# Patient Record
Sex: MALE | Race: WHITE | NOT HISPANIC OR LATINO | Employment: FULL TIME | URBAN - METROPOLITAN AREA
[De-identification: names, ages, dates, MRNs, and addresses within clinical notes are randomized per-mention and may not be internally consistent; named-entity substitution may affect disease eponyms.]

---

## 2017-08-11 ENCOUNTER — TELEPHONE (OUTPATIENT)
Dept: MEDICAL GROUP | Facility: MEDICAL CENTER | Age: 42
End: 2017-08-11

## 2017-08-14 ENCOUNTER — OFFICE VISIT (OUTPATIENT)
Dept: MEDICAL GROUP | Facility: MEDICAL CENTER | Age: 42
End: 2017-08-14
Payer: COMMERCIAL

## 2017-08-14 VITALS
BODY MASS INDEX: 25.01 KG/M2 | HEART RATE: 66 BPM | WEIGHT: 155.65 LBS | SYSTOLIC BLOOD PRESSURE: 108 MMHG | OXYGEN SATURATION: 98 % | TEMPERATURE: 98.2 F | HEIGHT: 66 IN | RESPIRATION RATE: 16 BRPM | DIASTOLIC BLOOD PRESSURE: 70 MMHG

## 2017-08-14 DIAGNOSIS — Z71.1 CONCERN ABOUT SKIN DISEASE WITHOUT DIAGNOSIS: ICD-10-CM

## 2017-08-14 DIAGNOSIS — L82.1 SEBORRHEIC KERATOSES: ICD-10-CM

## 2017-08-14 DIAGNOSIS — Z00.00 ROUTINE GENERAL MEDICAL EXAMINATION AT A HEALTH CARE FACILITY: ICD-10-CM

## 2017-08-14 DIAGNOSIS — J30.1 SEASONAL ALLERGIC RHINITIS DUE TO POLLEN: ICD-10-CM

## 2017-08-14 DIAGNOSIS — M25.542 ARTHRALGIA OF LEFT HAND: ICD-10-CM

## 2017-08-14 PROCEDURE — 99204 OFFICE O/P NEW MOD 45 MIN: CPT | Performed by: FAMILY MEDICINE

## 2017-08-14 RX ORDER — LORATADINE 10 MG/1
10 TABLET ORAL DAILY
COMMUNITY
End: 2023-10-12

## 2017-08-14 ASSESSMENT — PATIENT HEALTH QUESTIONNAIRE - PHQ9: CLINICAL INTERPRETATION OF PHQ2 SCORE: 0

## 2017-08-14 NOTE — PROGRESS NOTES
"Subjective:     Chief Complaint   Patient presents with   • Establish Care     DERM REFERRAL/ l PRESSURE ON PINKY       History of Present Illness:  Nicolas Green is a 42 y.o. male patient new to [facility] who presents today to establish primary medical care, discuss management of left hand arthralgias, as well as discuss concerns for seasonal allergies.  PMH, PSH, Social History, Medications, Allergies, FMH all reviewed as documented:    Arthralgia of left hand  Patient states that he has had left hand arthralgias for approximately one year. Arthralgias or localized to second and fifth digits, mostly in the proximal interphalangeal joints, patient states that achy pain is worse in the morning and improves with use throughout the day.    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 Patient is right-hand dominant. Patient does not use left hand is a dominant hand for any particular work.    Of note, patient does have a history of arthritis in his maternal grandmother as well as a history of fibromyalgia and mother. Patient carries no previous diagnoses for arthralgias or other musculoskeletal injuries, also has no sports injuries in the past, and does not have diffuse joint pains.    ROS negative for fevers, chills, generalized weakness, nausea, emesis, erythematous skin lesions.    Seasonal allergic rhinitis due to pollen  Patient states that he has bronchitis approximately 2 times per year, can be pharyngitis instead. Patient states that when these episodes occur, he has mild cough with morning clearing of green colored phlegm. Patient has been on antibiotics in the past for these. Otherwise, patient states that he may \"develop walking pneumonia.\" (He has had 1-2 episodes of pneumonia in the past, was never hospitalized for this.)    Of note, patient works as a , does notice dyspnea on exertion when he has " the symptoms.    Symptoms appear to be well controlled on Claritin 10 mg by mouth daily.    ROS is POSITIVE for morning rhinorrhea, cough with green phlegm in the morning, NEGATIVE for increased lacrimation, b/l itchy eyes,  post-nasal drip, sneezing, wheezing, dyspnea, respiratory distress, palpitations, tachycardia, rash, generalized pruritis, rash/hives.    Seborrheic keratoses  Patient has 2 skin lesions that he and his wife are concerned about. One is on the lateral portion of his left quadriceps, second lesion is on the mid thoracic back approximately T4 area on the left. First lesion is approximately 4 mm in diameter, roughly square shaped. Second lesion is 5-6 mm, elliptical versus rhomboid. Both these lesions are flat, fairly uniform in color, and with regular borders.    Concern about skin disease without diagnosis  In addition to concerns about skin (please see notes from same date of service 8/14/2017 regarding seborrheic keratoses), patient would also like general skin evaluation by dermatologist for annual check.      Patient Active Problem List    Diagnosis Date Noted   • Arthralgia of left hand 08/14/2017   • Seasonal allergic rhinitis due to pollen 08/14/2017   • Seborrheic keratoses 08/14/2017   • Concern about skin disease without diagnosis 08/14/2017       Additional History:   Allergies:    Review of patient's allergies indicates no known allergies.     Medications:     Current Outpatient Prescriptions Ordered in [facility]   Medication Sig Dispense Refill   • loratadine (CLARITIN) 10 MG Tab Take 10 mg by mouth every day.     • Ascorbic Acid (VITAMIN C PO) Take  by mouth.     • Multiple Vitamins-Minerals (CENTRUM ADULTS PO) Take  by mouth.       No current Epic-ordered facility-administered medications on file.        Past Medical History:   History reviewed. No pertinent past medical history.     Past Surgical History:   History reviewed. No pertinent past surgical history.     Social  "History:     Social History   Substance Use Topics   • Smoking status: Never Smoker    • Smokeless tobacco: Never Used   • Alcohol Use: No        Family History:     Family Status   Relation Status Death Age   • Maternal Grandmother Alive    • Mother Alive    • Father  Brain aneurysm   • Brother Alive    • Brother Alive    • Maternal Grandfather     • Paternal Grandmother     • Paternal Grandfather          Family History   Problem Relation Age of Onset   • Arthritis Maternal Grandmother    • Dementia Maternal Grandmother    • Other Mother      Fibromyalgia   • Hyperlipidemia Father    • No Known Problems Brother    • No Known Problems Brother    • Stroke Neg Hx    • Heart Attack Neg Hx    • Cancer Neg Hx        ROS:     - Constitutional: Negative for fever, chills, unexpected weight change, and fatigue/generalized weakness.     - Respiratory: Negative for cough, chest congestion, wheezing, and crackles.      - Cardiovascular: Negative for chest pain, palpitations, orthopnea, and bilateral lower extremity edema.     - Gastrointestinal: Negative for heartburn, nausea, vomiting, abdominal pain, hematochezia, melena, diarrhea, constipation, and greasy/foul-smelling stools.     - Genitourinary: Negative for dysuria, polyuria, hematuria, pyuria, urinary urgency, and urinary incontinence.    - Musculoskeletal: Negative for myalgias, back pain, and joint pain.     - NOTE: All other systems reviewed and are negative, except as in HPI.     Objective:   Physical Exam:    Vitals: Blood pressure 108/70, pulse 66, temperature 36.8 °C (98.2 °F), resp. rate 16, height 1.689 m (5' 6.5\"), weight 70.6 kg (155 lb 10.3 oz), SpO2 98 %.   BMI: Body mass index is 24.75 kg/(m^2).   General/Constitutional: Vitals as above, Well nourished, well developed male in no acute distress   Head/Eyes:  - Head is grossly normal & atraumatic  - Bilateral conjunctivae clear and not injected, bilateral EOMI, bilateral " PERRL   ENT:   - Bilateral external ears grossly normal in appearance, external auditory canals clear & bilateral TMs visualized with appropriate cone of light reflex, hearing grossly intact  - External nares normal in appearance and without discharge/bleeding, bilateral turbinates non-erythematous/non-edematous and without discharge/bleeding  - Good dentition & no palpable fluctuance of gums,  posterior oropharynx without erythema/edema/exudates  Neck: Neck supple, no masses, neck non-tender to palpation, no thyromegaly/goiter   Respiratory: No respiratory distress, bilateral lungs are clear to auscultation in all lung fields (anterior/lateral/posterior), no wheezing/rhonchi/rales   Cardiovascular: Regular rate and rhythm without murmur/gallops/rubs, distal pulses equal and 2+ bilaterally (radial, posterior tibial), no bilateral lower extremity edema   Gastrointestinal: Abdomen resonant to percussion, Bowel sounds present in all 4 quadrants, abdomen non-tender to light and deep palpation, hepatosplenomegaly grossly absent, ventral/umbilical hernias absent    MSK: Palpation of bilateral hands does not reveal any bogginess/fluid collections around joints or otherwise tenderness palpation, palpation of A1 pulleys of left hand reveals clicking/tenderness to palpation of second and third digits, otherwise Gait grossly normal & not antalgic, no tenderness to percussion of vertebral processes, no CVAT, no bilateral SI joint tenderness   Integumentary: 2 skin lesions (First = lateral portion of his left quadriceps, Second = on mid thoracic back approximately T4 area on the left. First lesion is approximately 4 mm in diameter, roughly square shaped. Second lesion is 5-6 mm, elliptical versus rhomboid. Both these lesions are flat, fairly uniform in color, and with regular borders), No apparent rashes   Neuro: Gross motor movement intact in all 4 extremities, Gross sensation intact to extremities and trunk, Gait grossly  normal and not antalgic   Psych: Judgment grossly appropriate, no apparent depression/anxiety      Health Maintenance:      - Appears to be uptodate    Imaging/Labs:      - I have requested previous records ([facility]), and will update accordingly.    Assessment and Plan:   1. Arthralgia of left hand  Uncontrolled.  Appears to be early trigger fingers. We have discussed that in the future he may require either ibuprofen or trigger finger injections.    2. Seasonal allergic rhinitis due to pollen  Stable, well-controlled on OTC Claritin.  Continue Claritin.    3. Seborrheic keratoses  4. Concern about skin disease without diagnosis  Uncontrolled.  We discussed that we can address his cryotherapy. Additionally, patient requesting dermatology referral for general skin check.   - REFERRAL TO DERMATOLOGY    5. Routine general medical examination at a health care facility  Unknown control general health. Labs as below to evaluate.   - HEMOGLOBIN A1C; Future   - LIPID PROFILE; Future   - COMP METABOLIC PANEL; Future    Patient and I discussed the importance of lifestyle changes, with particular emphasis on plant-based nutrition (for the purposes of weight loss, general health), as well as cardiovascular exercise, proper sleep, and stress management.  This discussion is briefly summarized in the patient instruction section below.  Patient verbalized understanding.    RTC: in 2 weeks for labs review, possible Cryotherapy.    PLEASE NOTE: This dictation was created using voice recognition software. I have made every reasonable attempt to correct obvious errors, but I expect that there are errors of grammar and possibly content that I did not discover before finalizing the note.

## 2017-08-14 NOTE — Clinical Note
[facility]  Kirt Marie M.D.  [address]  [place] [address]  Fax: [phone]   Authorization for Release/Disclosure of   Protected Health Information   Name: NICOLAS OLIVERA : 1975 SSN: [id number]   Address: [address] [place] [address]  [place] [address] Phone:    [phone] (home)    I authorize the entity listed below to release/disclose the PHI below to:   [facility]/Kirt Marie M.D. and Kirt Marie M.D.   Provider or Entity Name:     Address   City, State, Zip   Phone:      Fax:     Reason for request: continuity of care   Information to be released:    [  ] LAST COLONOSCOPY,  including any PATH REPORT and follow-up  [  ] LAST FIT/COLOGUARD RESULT [  ] LAST DEXA  [  ] LAST MAMMOGRAM  [  ] LAST PAP  [  ] LAST LABS [  ] RETINA EXAM REPORT  [  ] IMMUNIZATION RECORDS  [  ] Release all info      [  ] Check here and initial the line next to each item to release ALL health information INCLUDING  _____ Care and treatment for drug and / or alcohol abuse  _____ HIV testing, infection status, or AIDS  _____ Genetic Testing    DATES OF SERVICE OR TIME PERIOD TO BE DISCLOSED: _____________  I understand and acknowledge that:  * This Authorization may be revoked at any time by you in writing, except if your health information has already been used or disclosed.  * Your health information that will be used or disclosed as a result of you signing this authorization could be re-disclosed by the recipient. If this occurs, your re-disclosed health information may no longer be protected by State or Federal laws.  * You may refuse to sign this Authorization. Your refusal will not affect your ability to obtain treatment.  * This Authorization becomes effective upon signing and will  on (date) __________.      If no date is indicated, this Authorization will  one (1) year from the signature date.    Name: Nicolas Olivera    Signature:   Date:     2017          PLEASE FAX REQUESTED RECORDS BACK TO: [phone]

## 2017-08-14 NOTE — ASSESSMENT & PLAN NOTE
In addition to concerns about skin (please see notes from same date of service 8/14/2017 regarding seborrheic keratoses), patient would also like general skin evaluation by dermatologist for annual check.

## 2017-08-14 NOTE — ASSESSMENT & PLAN NOTE
Patient has 2 skin lesions that he and his wife are concerned about. One is on the lateral portion of his left quadriceps, second lesion is on the mid thoracic back approximately T4 area on the left. First lesion is approximately 4 mm in diameter, roughly square shaped. Second lesion is 5-6 mm, elliptical versus rhomboid. Both these lesions are flat, fairly uniform in color, and with regular borders.

## 2017-08-14 NOTE — MR AVS SNAPSHOT
"        Nicolas Green   2017 7:40 AM   Office Visit   MRN: [id number]    Department:  [place] [address]   Dept Phone:  [phone]    Description:  Male : 1975   Provider:  Kirt Marie M.D.           Reason for Visit     Establish Care DERM REFERRAL/ l PRESSURE ON PINKY      Allergies as of 2017     No Known Allergies      You were diagnosed with     Arthralgia of left hand   [653782]       Seasonal allergic rhinitis due to pollen   [7727247]       Seborrheic keratoses   [5525105]       Concern about skin disease without diagnosis   [584484]       Routine general medical examination at a health care facility   [V70.0.ICD-9-CM]         Vital Signs     Blood Pressure Pulse Temperature Respirations Height Weight    108/70 mmHg 66 36.8 °C (98.2 °F) 16 1.689 m (5' 6.5\") 70.6 kg (155 lb 10.3 oz)    Body Mass Index Oxygen Saturation Smoking Status             24.75 kg/m2 98% Never Smoker          Basic Information     Date Of Birth Sex Race Ethnicity Preferred Language    1975 Male Unknown Unknown English      Problem List              ICD-10-CM Priority Class Noted - Resolved    Arthralgia of left hand M25.542   2017 - Present    Seasonal allergic rhinitis due to pollen J30.1   2017 - Present    Seborrheic keratoses L82.1   2017 - Present    Concern about skin disease without diagnosis Z71.1   2017 - Present      Health Maintenance        Date Due Completion Dates    IMM INFLUENZA (1) 2017 10/22/2016, 2015    IMM DTaP/Tdap/Td Vaccine (2 - Td) 10/16/2025 10/16/2015            Current Immunizations     Influenza Vaccine Quad Inj (Pf) 10/22/2016 11:09 AM, 2015 10:04 AM    Tdap Vaccine 10/16/2015      Below and/or attached are the medications your provider expects you to take. Review all of your home medications and newly ordered medications with your provider and/or pharmacist. Follow medication instructions as directed by your provider and/or " pharmacist. Please keep your medication list with you and share with your provider. Update the information when medications are discontinued, doses are changed, or new medications (including over-the-counter products) are added; and carry medication information at all times in the event of emergency situations     Allergies:  No Known Allergies          Medications  Valid as of: August 14, 2017 -  8:27 AM    Generic Name Brand Name Tablet Size Instructions for use    Ascorbic Acid   Take  by mouth.        Loratadine (Tab) CLARITIN 10 MG Take 10 mg by mouth every day.        Multiple Vitamins-Minerals   Take  by mouth.        .                 Medicines prescribed today were sent to:     [id number] [place] [facility] [place] - [address] [facility]    [address] [facility] [place] [address]    Phone: [phone] Fax: [phone]    Open 24 Hours?: No      Medication refill instructions:       If your prescription bottle indicates you have medication refills left, it is not necessary to call your provider’s office. Please contact your pharmacy and they will refill your medication.    If your prescription bottle indicates you do not have any refills left, you may request refills at any time through one of the following ways: The online tuul system (except Urgent Care), by calling your provider’s office, or by asking your pharmacy to contact your provider’s office with a refill request. Medication refills are processed only during regular business hours and may not be available until the next business day. Your provider may request additional information or to have a follow-up visit with you prior to refilling your medication.   *Please Note: Medication refills are assigned a new Rx number when refilled electronically. Your pharmacy may indicate that no refills were authorized even though a new prescription for the same medication is available at the pharmacy. Please request the medicine by name with the pharmacy before  "contacting your provider for a refill.        Your To Do List     Future Labs/Procedures Complete By Expires    COMP METABOLIC PANEL  As directed 8/14/2018    HEMOGLOBIN A1C  As directed 8/14/2018    LIPID PROFILE  As directed 8/14/2018    Comments:    Fasting at least 8hours      Referral     A referral request has been sent to our patient care coordination department. Please allow 3-5 business days for us to process this request and contact you either by phone or mail. If you do not hear from us by the 5th business day, please call us at [phone].        Instructions    Dr. Marie's tips for \"Lifestyle Medicine:\"     Check out the talk/documentary on \"How not to die\" by Dr. Chito Red (on his website nutritionfacts.org, he also authored a book with this title).       1) Make SMART lifestyle changes: Specific, Measurable, Attainable, Relevant, Time-sensitive.  The lifestyle changes that you need to make are with regards to: nutrition, cardiovascular exercise, sleep, stress management.  Make these changes every 2 weeks, revisiting the previous goals and perhaps revising them and/or setting new ones.       2) Nutrition: Make as many changes as you can to increase the amount of whole-foods (not Whole Foods, necessarily!  ;-)), plant-based diet as possible:   A) Books: Eat to Live (Dr. Andrea Kerns), The Spectrum (Dr. Bruce Jc), The Starch Solution (Dr. Andrew Le)      B) Documentaries (can usually be found on [employer]): [place] Over Knives.  Fat, Sick, and Nearly Dead.  Fed Up.           3) Cardiovascular Exercise: The center for disease control recommends a minimum of 150 minutes per week of moderate intensity cardiovascular exercise for weight maintenance and cardiovascular health.  Set this as your initial goal, with at least 30 minutes per session. Types of exercise can include 30 minutes of elliptical, 30 minutes of decently fast jog, 30 minutes of swimming, 30 minutes of heavy gardening (lifting big " bags of fertilizer, digging deep holes/ditches).  He can cut down the minute requirements to half, by doing higher intensity sports such as a game of tennis, or soccer.  He notes the library and check out with they have for home exercise programs, as well.       4) Sleep:    A) Goal: Obtain a minimum of 7-8hours of continuous, uninterrupted, restful sleep per night.    B) Tips for Sleep Hygiene:    I) Go to bed and wake up at consistent times whether work/school day or not.     II) Keep room dark, quiet, and comfortable.  Increase exposure to sunlight during awake times and avoid bright lights (especially anything with a backlight) at least the last 1-2hours before going to sleep.     III) Don't nap.     IV) Avoid stimulant or caffeine use more than 4 hours after wake time.        5) Stress Management: You cannot change the stresses of life dizziness necessarily, but you can change how he responds of them. One good way to manage stress is to write things down in order to help you process how to approach things in general or specifically. Another good way is to talk it out with someone you trusts, specifically your significant other or good friend. A definite great way to deal with stress is to have cardiovascular exercise!          Software Technology Access Code: Activation code not generated  Current Software Technology Status: Active

## 2017-08-14 NOTE — ASSESSMENT & PLAN NOTE
"Patient states that he has bronchitis approximately 2 times per year, can be pharyngitis instead. Patient states that when these episodes occur, he has mild cough with morning clearing of green colored phlegm. Patient has been on antibiotics in the past for these. Otherwise, patient states that he may \"develop walking pneumonia.\" (He has had 1-2 episodes of pneumonia in the past, was never hospitalized for this.)    Of note, patient works as a , does notice dyspnea on exertion when he has the symptoms.    Symptoms appear to be well controlled on Claritin 10 mg by mouth daily.    ROS is POSITIVE for morning rhinorrhea, cough with green phlegm in the morning, NEGATIVE for increased lacrimation, b/l itchy eyes,  post-nasal drip, sneezing, wheezing, dyspnea, respiratory distress, palpitations, tachycardia, rash, generalized pruritis, rash/hives.  "

## 2017-08-14 NOTE — PATIENT INSTRUCTIONS
"Dr. Marie's tips for \"Lifestyle Medicine:\"     Check out the talk/documentary on \"How not to die\" by Dr. Chito Red (on his website nutritionfacts.org, he also authored a book with this title).       1) Make SMART lifestyle changes: Specific, Measurable, Attainable, Relevant, Time-sensitive.  The lifestyle changes that you need to make are with regards to: nutrition, cardiovascular exercise, sleep, stress management.  Make these changes every 2 weeks, revisiting the previous goals and perhaps revising them and/or setting new ones.       2) Nutrition: Make as many changes as you can to increase the amount of whole-foods (not Whole Foods, necessarily!  ;-)), plant-based diet as possible:   A) Books: Eat to Live (Dr. Andrea Kerns), The Spectrum (Dr. Bruce Jc), The Starch Solution (Dr. Andrew Le)      B) Documentaries (can usually be found on [employer]): [place] Over Knives.  Fat, Sick, and Nearly Dead.  Fed Up.           3) Cardiovascular Exercise: The center for disease control recommends a minimum of 150 minutes per week of moderate intensity cardiovascular exercise for weight maintenance and cardiovascular health.  Set this as your initial goal, with at least 30 minutes per session. Types of exercise can include 30 minutes of elliptical, 30 minutes of decently fast jog, 30 minutes of swimming, 30 minutes of heavy gardening (lifting big bags of fertilizer, digging deep holes/ditches).  He can cut down the minute requirements to half, by doing higher intensity sports such as a game of tennis, or soccer.  He notes the library and check out with they have for home exercise programs, as well.       4) Sleep:    A) Goal: Obtain a minimum of 7-8hours of continuous, uninterrupted, restful sleep per night.    B) Tips for Sleep Hygiene:    I) Go to bed and wake up at consistent times whether work/school day or not.     II) Keep room dark, quiet, and comfortable.  Increase exposure to sunlight during awake times and " avoid bright lights (especially anything with a backlight) at least the last 1-2hours before going to sleep.     III) Don't nap.     IV) Avoid stimulant or caffeine use more than 4 hours after wake time.        5) Stress Management: You cannot change the stresses of life dizziness necessarily, but you can change how he responds of them. One good way to manage stress is to write things down in order to help you process how to approach things in general or specifically. Another good way is to talk it out with someone you trusts, specifically your significant other or good friend. A definite great way to deal with stress is to have cardiovascular exercise!

## 2017-08-14 NOTE — ASSESSMENT & PLAN NOTE
Patient states that he has had left hand arthralgias for approximately one year. Arthralgias or localized to second and fifth digits, mostly in the proximal interphalangeal joints, patient states that achy pain is worse in the morning and improves with use throughout the day.    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 Patient is right-hand dominant. Patient does not use left hand is a dominant hand for any particular work.    Of note, patient does have a history of arthritis in his maternal grandmother as well as a history of fibromyalgia and mother. Patient carries no previous diagnoses for arthralgias or other musculoskeletal injuries, also has no sports injuries in the past, and does not have diffuse joint pains.    ROS negative for fevers, chills, generalized weakness, nausea, emesis, erythematous skin lesions.

## 2017-08-18 ENCOUNTER — HOSPITAL ENCOUNTER (OUTPATIENT)
Dept: LAB | Facility: MEDICAL CENTER | Age: 42
End: 2017-08-18
Attending: FAMILY MEDICINE
Payer: COMMERCIAL

## 2017-08-18 DIAGNOSIS — Z00.00 ROUTINE GENERAL MEDICAL EXAMINATION AT A HEALTH CARE FACILITY: ICD-10-CM

## 2017-08-18 LAB
ALBUMIN SERPL BCP-MCNC: 4.5 G/DL (ref 3.2–4.9)
ALBUMIN/GLOB SERPL: 2.1 G/DL
ALP SERPL-CCNC: 95 U/L (ref 30–99)
ALT SERPL-CCNC: 52 U/L (ref 2–50)
ANION GAP SERPL CALC-SCNC: 9 MMOL/L (ref 0–11.9)
AST SERPL-CCNC: 38 U/L (ref 12–45)
BILIRUB SERPL-MCNC: 1.5 MG/DL (ref 0.1–1.5)
BUN SERPL-MCNC: 22 MG/DL (ref 8–22)
CALCIUM SERPL-MCNC: 9.5 MG/DL (ref 8.5–10.5)
CHLORIDE SERPL-SCNC: 107 MMOL/L (ref 96–112)
CHOLEST SERPL-MCNC: 184 MG/DL (ref 100–199)
CO2 SERPL-SCNC: 25 MMOL/L (ref 20–33)
CREAT SERPL-MCNC: 1.11 MG/DL (ref 0.5–1.4)
GFR SERPL CREATININE-BSD FRML MDRD: >60 ML/MIN/1.73 M 2
GLOBULIN SER CALC-MCNC: 2.1 G/DL (ref 1.9–3.5)
GLUCOSE SERPL-MCNC: 92 MG/DL (ref 65–99)
HDLC SERPL-MCNC: 52 MG/DL
LDLC SERPL CALC-MCNC: 117 MG/DL
POTASSIUM SERPL-SCNC: 4.4 MMOL/L (ref 3.6–5.5)
PROT SERPL-MCNC: 6.6 G/DL (ref 6–8.2)
SODIUM SERPL-SCNC: 141 MMOL/L (ref 135–145)
TRIGL SERPL-MCNC: 76 MG/DL (ref 0–149)

## 2017-08-18 PROCEDURE — 80061 LIPID PANEL: CPT

## 2017-08-18 PROCEDURE — 83036 HEMOGLOBIN GLYCOSYLATED A1C: CPT

## 2017-08-18 PROCEDURE — 80053 COMPREHEN METABOLIC PANEL: CPT

## 2017-08-18 PROCEDURE — 36415 COLL VENOUS BLD VENIPUNCTURE: CPT

## 2017-08-19 LAB
EST. AVERAGE GLUCOSE BLD GHB EST-MCNC: 103 MG/DL
HBA1C MFR BLD: 5.2 % (ref 0–5.6)

## 2017-08-28 ENCOUNTER — OFFICE VISIT (OUTPATIENT)
Dept: MEDICAL GROUP | Facility: MEDICAL CENTER | Age: 42
End: 2017-08-28
Payer: COMMERCIAL

## 2017-08-28 VITALS
HEART RATE: 65 BPM | OXYGEN SATURATION: 98 % | TEMPERATURE: 97.5 F | DIASTOLIC BLOOD PRESSURE: 70 MMHG | WEIGHT: 156.75 LBS | HEIGHT: 67 IN | SYSTOLIC BLOOD PRESSURE: 110 MMHG | BODY MASS INDEX: 24.6 KG/M2 | RESPIRATION RATE: 14 BRPM

## 2017-08-28 DIAGNOSIS — E78.00 ELEVATED LDL CHOLESTEROL LEVEL: ICD-10-CM

## 2017-08-28 DIAGNOSIS — R74.01 TRANSAMINITIS: ICD-10-CM

## 2017-08-28 PROCEDURE — 99214 OFFICE O/P EST MOD 30 MIN: CPT | Performed by: FAMILY MEDICINE

## 2017-08-28 NOTE — ASSESSMENT & PLAN NOTE
Patient and I discussed recent labs (see below) and that 10year ASCVD risk based on most recent lipid panel, current blood pressure (non-hypertensive, without medication), diabetes status (non-diabetic), and smoking status (non-smoker) is: calculated risk 0.7%, with lifetime risk with 36%.    Patient and I then discussed necessary dietary changes to make to address dyslipidemia.  Patient verbalized understanding.    ROS is NEGATIVE for dizziness, generalized weakness/fatigue, vision/hearing changes, jaw pain/paresthesias, BUE pain/paresthesias/numbness/weakness, chest pain/pressure, palpitations, dyspnea, RUQ abdominal pain, oliguria/anuria, BLE edema.    Lab Results   Component Value Date/Time    CHOLSTRLTOT 184 08/18/2017 06:27 AM     (H) 08/18/2017 06:27 AM    HDL 52 08/18/2017 06:27 AM    TRIGLYCERIDE 76 08/18/2017 06:27 AM

## 2017-08-29 NOTE — ASSESSMENT & PLAN NOTE
Patient reviewed ALT, that is elevated to 52, with findings of normal being 50. Patient denies any increase in alcohol drinking. Patient denies any previous history or suspicion of hepatitis B and hepatitis C, or exposure to it or injection drug use or tattoos.    We discussed therefore that most likely diagnosis is fatty liver disease. Patient verbalizes understanding.

## 2017-08-29 NOTE — PROGRESS NOTES
Subjective:     Chief Complaint   Patient presents with   • Follow-Up       History of Present Illness:  Nicolas Green is a 42 y.o. male established patient who presents today to discuss Cholesterol management, as well as recent labs:    Elevated LDL cholesterol level  Patient and I discussed recent labs (see below) and that 10year ASCVD risk based on most recent lipid panel, current blood pressure (non-hypertensive, without medication), diabetes status (non-diabetic), and smoking status (non-smoker) is: calculated risk 0.7%, with lifetime risk with 36%.    Patient and I then discussed necessary dietary changes to make to address dyslipidemia.  Patient verbalized understanding.    ROS is NEGATIVE for dizziness, generalized weakness/fatigue, vision/hearing changes, jaw pain/paresthesias, BUE pain/paresthesias/numbness/weakness, chest pain/pressure, palpitations, dyspnea, RUQ abdominal pain, oliguria/anuria, BLE edema.    Lab Results   Component Value Date/Time    CHOLSTRLTOT 184 08/18/2017 06:27 AM     (H) 08/18/2017 06:27 AM    HDL 52 08/18/2017 06:27 AM    TRIGLYCERIDE 76 08/18/2017 06:27 AM       Transaminitis  Patient reviewed ALT, that is elevated to 52, with findings of normal being 50. Patient denies any increase in alcohol drinking. Patient denies any previous history or suspicion of hepatitis B and hepatitis C, or exposure to it or injection drug use or tattoos.    We discussed therefore that most likely diagnosis is fatty liver disease. Patient verbalizes understanding.      Patient Active Problem List    Diagnosis Date Noted   • Elevated LDL cholesterol level 08/28/2017   • Transaminitis 08/28/2017   • Arthralgia of left hand 08/14/2017   • Seasonal allergic rhinitis due to pollen 08/14/2017   • Seborrheic keratoses 08/14/2017   • Concern about skin disease without diagnosis 08/14/2017       Additional History:   Allergies:    Review of patient's allergies indicates no known  "allergies.     Current Medications:     Current Outpatient Prescriptions   Medication Sig Dispense Refill   • loratadine (CLARITIN) 10 MG Tab Take 10 mg by mouth every day.     • Ascorbic Acid (VITAMIN C PO) Take  by mouth.     • Multiple Vitamins-Minerals (CENTRUM ADULTS PO) Take  by mouth.       No current facility-administered medications for this visit.         Social History:     Social History   Substance Use Topics   • Smoking status: Never Smoker   • Smokeless tobacco: Never Used   • Alcohol use No       ROS:     - NOTE: All other systems reviewed and are negative, except as in HPI.     Objective:   Physical Exam:    Vitals: Blood pressure 110/70, pulse 65, temperature 36.4 °C (97.5 °F), resp. rate 14, height 1.689 m (5' 6.5\"), weight 71.1 kg (156 lb 12 oz), SpO2 98 %.   BMI: Body mass index is 24.92 kg/m².   General/Constitutional: Vitals as above, Well nourished, well developed male in no acute distress   Head/Eyes: Head is grossly normal & atraumatic, bilateral conjunctivae clear and not injected, bilateral EOMI, bilateral PERRL   ENT: Bilateral external ears grossly normal in appearance, Hearing grossly intact, External nares normal in appearance and without discharge/bleeding   Respiratory: No respiratory distress, bilateral lungs are clear to ausculation in all lung fields (anterior/lateral/posterior), no wheezing/rhonchi/rales   Cardiovascular: Regular rate and rhythm without murmur/gallops/rubs, distal pulses are intact and equal bilaterally (radial, posterior tibial), no bilateral lower extremity edema   MSK: Gait grossly normal & not antalgic   Integumentary: No apparent rashes   Psych: Judgment grossly appropriate, no apparent depression/anxiety    Health Maintenance:      - Appears to be up-to-date.    Imaging/Labs:      - Reviewed interpreted as in history of present illness above.    Assessment and Plan:   NOTE: Patient and I discussed the importance of lifestyle changes, with particular " emphasis on plant-based nutrition (for the purposes of weight loss, general health, LDL elevation, transaminitis), as well as cardiovascular exercise, proper sleep, and stress management.  This discussion is briefly summarized in the patient instruction section below.  Patient verbalized understanding.    1. Elevated LDL cholesterol level  Uncontrolled. See management decision as above. We're withholding medications for now.   - LIPID PROFILE; Future    2. Transaminitis  Uncontrolled. See management decision as above.    - HEPATIC FUNCTION PANEL; Future      RTC: in 07/2018 for annual medical exam.    PLEASE NOTE: This dictation was created using voice recognition software. I have made every reasonable attempt to correct obvious errors, but I expect that there are errors of grammar and possibly content that I did not discover before finalizing the note.

## 2017-10-18 ENCOUNTER — TELEPHONE (OUTPATIENT)
Dept: MEDICAL GROUP | Facility: MEDICAL CENTER | Age: 42
End: 2017-10-18

## 2017-10-18 NOTE — TELEPHONE ENCOUNTER
ESTABLISHED PATIENT PRE-VISIT PLANNING     Note: Patient will not be contacted if there is no indication to call.     1.  Reviewed notes from the last few office visits within the medical group: Yes  08/28/2017  2.  If any orders were placed at last visit or intended to be done for this visit (i.e. 6 mos follow-up), do we have Results/Consult Notes?        •  Labs - Patient is being seen for cough    Note: If patient appointment is for lab review and patient did not complete labs,                check with provider if OK to reschedule patient until labs completed.       •  Imaging - Imaging was not ordered at last office visit.      3. Is this appointment scheduled as a Hospital Follow-Up? No    4.  Immunizations were updated in [facility] using [employer]?: Yes       •  [employer] Recommendations: FLU, HEPATITIS B, MMR , TD and VARICELLA (Chicken Pox)     5.  Patient is due for the following Health Maintenance Topics:   Health Maintenance Due   Topic Date Due   • IMM INFLUENZA (1) 09/01/2017         6.  Patient was NOT informed to arrive 15 min prior to their scheduled appointment and bring in their medication bottles.

## 2017-10-19 ENCOUNTER — OFFICE VISIT (OUTPATIENT)
Dept: MEDICAL GROUP | Facility: MEDICAL CENTER | Age: 42
End: 2017-10-19
Payer: COMMERCIAL

## 2017-10-19 VITALS
RESPIRATION RATE: 16 BRPM | BODY MASS INDEX: 25.36 KG/M2 | HEIGHT: 67 IN | TEMPERATURE: 98.4 F | DIASTOLIC BLOOD PRESSURE: 70 MMHG | WEIGHT: 161.6 LBS | SYSTOLIC BLOOD PRESSURE: 104 MMHG | OXYGEN SATURATION: 97 % | HEART RATE: 82 BPM

## 2017-10-19 DIAGNOSIS — J06.9 ACUTE URI: ICD-10-CM

## 2017-10-19 PROCEDURE — 99214 OFFICE O/P EST MOD 30 MIN: CPT | Performed by: FAMILY MEDICINE

## 2017-10-19 RX ORDER — AZITHROMYCIN 250 MG/1
500 TABLET, FILM COATED ORAL DAILY
Qty: 6 TAB | Refills: 0 | Status: SHIPPED | OUTPATIENT
Start: 2017-10-19 | End: 2018-03-05

## 2017-10-19 RX ORDER — PREDNISONE 20 MG/1
20 TABLET ORAL 2 TIMES DAILY
Qty: 10 TAB | Refills: 0 | Status: SHIPPED | OUTPATIENT
Start: 2017-10-19 | End: 2017-10-24

## 2017-10-19 NOTE — PROGRESS NOTES
Subjective:   Chief Complaint/History of Present Illness:  Nicolas Green is a 42 y.o. male established patient who presents today to have evaluation of acute respiratory illness:    Acute URI  Patient has been having to his history of chronic productive cough, but has not developed into a productive green thick sputum. 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 Otherwise, patient is still considering the same otherwise doesn't base, approximately 20-30 ounces per day.    Patient has been using over-the-counter medications such as decongestants, as well as antihistamines.        Patient has no fever present time, also denies any chills, nausea, emesis, loose stools, rash.      Patient Active Problem List    Diagnosis Date Noted   • Acute URI 10/19/2017   • Elevated LDL cholesterol level 08/28/2017   • Transaminitis 08/28/2017   • Arthralgia of left hand 08/14/2017   • Seasonal allergic rhinitis due to pollen 08/14/2017   • Seborrheic keratoses 08/14/2017   • Concern about skin disease without diagnosis 08/14/2017       Additional History:   Allergies:    Review of patient's allergies indicates no known allergies.     Current Medications:     Current Outpatient Prescriptions   Medication Sig Dispense Refill   • Pseudoephedrine-DM-GG (SUDAFED COUGH PO) Take  by mouth.     • predniSONE (DELTASONE) 20 MG Tab Take 1 Tab by mouth 2 times a day for 5 days. 10 Tab 0   • azithromycin (ZITHROMAX) 250 MG Tab Take 2 Tabs by mouth every day. 6 Tab 0   • loratadine (CLARITIN) 10 MG Tab Take 10 mg by mouth every day.     • Ascorbic Acid (VITAMIN C PO) Take  by mouth.     • Multiple Vitamins-Minerals (CENTRUM ADULTS PO) Take  by mouth.       No current facility-administered medications for this visit.         Social History:     Social History   Substance Use Topics   • Smoking status: Never Smoker   • Smokeless tobacco: Never  "Used   • Alcohol use No       ROS:     - NOTE: All other systems reviewed and are negative, except as in HPI.     Objective:   Physical Exam:    Vitals: Blood pressure 104/70, pulse 82, temperature 36.9 °C (98.4 °F), resp. rate 16, height 1.689 m (5' 6.5\"), weight 73.3 kg (161 lb 9.6 oz), SpO2 97 %.   BMI: Body mass index is 25.69 kg/m².   General/Constitutional: Vitals as above, Well nourished, well developed male in no acute distress   Head/Eyes: Head is grossly normal & atraumatic, bilateral conjunctivae not injected, bilateral EOMI, bilateral PERRL   ENT: Bilateral external ears grossly normal in appearance, Hearing grossly intact, bilateral external canals without cerumen impaction, bilateral TMs are able to the visualized with mild serous fluid without erythema/bulging/exudates, External nares normal in appearance and without discharge/bleeding, bilateral frontal/maxillary sinuses non-tender to palpation, posterior oropharynx with mild erythema but no exudates and without airway obstruction   Respiratory: No respiratory distress, bilateral lungs are clear to ausculation in all lung fields (anterior/lateral/posterior), no wheezing/rhonchi/rales   Cardiovascular: Regular rate and rhythm without murmur/gallops/rubs, distal pulses are intact and equal bilaterally (radial, posterior tibial), no bilateral lower extremity edema   MSK: Gait grossly normal & not antalgic   Integumentary: No apparent rashes   Psych: Judgment grossly appropriate, no apparent depression/anxiety    Health Maintenance:      - Patient inquires about flu shot, and after discussion states he will get it after his acute illness    Assessment and Plan:   1. Acute URI  Uncontrolled.  Patient given stepwise instructions on how to address his symptoms. First step is to increase his fluid intake as well as to continue homeopathic/conservative care at home. Second step is to use prednisone and this is insufficient, and third step would be to use " antibiotics. Please see patient instructions section as below for further details.   - predniSONE (DELTASONE) 20 MG Tab; Take 1 Tab by mouth 2 times a day for 5 days.  Dispense: 10 Tab; Refill: 0   - azithromycin (ZITHROMAX) 250 MG Tab; Take 2 Tabs by mouth every day.  Dispense: 6 Tab; Refill: 0    RTC: In July 2018 as scheduled for his annual medical exam, otherwise Presented earlier for worsening of these symptoms or any other medical concerns/questions/symptoms.    PLEASE NOTE: This dictation was created using voice recognition software. I have made every reasonable attempt to correct obvious errors, but I expect that there are errors of grammar and possibly content that I did not discover before finalizing the note.

## 2017-10-19 NOTE — PATIENT INSTRUCTIONS
Stage 1: Continue taking over-the-counter medications, including antihistamines, nasal decongestants, as well as increasing her fluid hydration. Additionally, things such as chicken soup with garlic can help boost immune system, as chicken contains zinc, and both these things can improvement immune health.    Stage 2: If in 1-2 days on the above therapy your symptoms are not improved, go ahead and take the prednisone twice daily for at least 3 days.    Stage 3: The stage II doesn't work, at the fourth and fifth day, then take the antibiotic twice daily for 3 days.

## 2017-10-19 NOTE — ASSESSMENT & PLAN NOTE
Patient has been having to his history of chronic productive cough, but has not developed into a productive green thick sputum. 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 Otherwise, patient is still considering the same otherwise doesn't base, approximately 20-30 ounces per day.    Patient has been using over-the-counter medications such as decongestants, as well as antihistamines.        Patient has no fever present time, also denies any chills, nausea, emesis, loose stools, rash.

## 2017-11-12 ENCOUNTER — APPOINTMENT (OUTPATIENT)
Dept: SOCIAL WORK | Facility: CLINIC | Age: 42
End: 2017-11-12
Payer: COMMERCIAL

## 2017-11-12 PROCEDURE — 90471 IMMUNIZATION ADMIN: CPT | Performed by: REGISTERED NURSE

## 2017-11-12 PROCEDURE — 90686 IIV4 VACC NO PRSV 0.5 ML IM: CPT | Performed by: REGISTERED NURSE

## 2018-03-05 ENCOUNTER — OFFICE VISIT (OUTPATIENT)
Dept: MEDICAL GROUP | Age: 43
End: 2018-03-05
Payer: COMMERCIAL

## 2018-03-05 VITALS
OXYGEN SATURATION: 97 % | BODY MASS INDEX: 26 KG/M2 | TEMPERATURE: 99.3 F | HEART RATE: 71 BPM | HEIGHT: 66 IN | SYSTOLIC BLOOD PRESSURE: 110 MMHG | DIASTOLIC BLOOD PRESSURE: 70 MMHG | WEIGHT: 161.8 LBS

## 2018-03-05 DIAGNOSIS — L73.9 FOLLICULITIS: ICD-10-CM

## 2018-03-05 DIAGNOSIS — J00 ACUTE NASOPHARYNGITIS: ICD-10-CM

## 2018-03-05 PROCEDURE — 99214 OFFICE O/P EST MOD 30 MIN: CPT | Performed by: INTERNAL MEDICINE

## 2018-03-05 RX ORDER — DOXYCYCLINE HYCLATE 100 MG
100 TABLET ORAL 2 TIMES DAILY
Qty: 14 TAB | Refills: 0 | Status: SHIPPED | OUTPATIENT
Start: 2018-03-05 | End: 2018-03-12 | Stop reason: SDUPTHER

## 2018-03-05 ASSESSMENT — PATIENT HEALTH QUESTIONNAIRE - PHQ9: CLINICAL INTERPRETATION OF PHQ2 SCORE: 0

## 2018-03-05 NOTE — ASSESSMENT & PLAN NOTE
Patient reported that he has itchy bumps on his anterior chest wall and back. He stated that symptoms started 3 days ago after he is wearing thick layers of clothes at work. He reported itchiness on the bumps.

## 2018-03-05 NOTE — ASSESSMENT & PLAN NOTE
This is a new problem. Patient reported having runny nose, sinus congestion and cough for 1.5 weeks. He tries over-the-counter Sudafed as was slightly improvement. He still has runny nose and sinus congestion. He denies chest congestion or wheezing. He reported feeling pain on his throat. He denied fever, chills.

## 2018-03-05 NOTE — PROGRESS NOTES
"Subjective:   Nicolas Green is a 42 y.o. male here today for evaluation and management of:      Acute nasopharyngitis  This is a new problem. Patient reported having runny nose, sinus congestion and cough for 1.5 weeks. He tries over-the-counter Sudafed as was slightly improvement. He still has runny nose and sinus congestion. He denies chest congestion or wheezing. He reported feeling pain on his throat. He denied fever, chills.    Folliculitis  Patient reported that he has itchy bumps on his anterior chest wall and back. He stated that symptoms started 3 days ago after he is wearing thick layers of clothes at work. He reported itchiness on the bumps.         Current medicines (including changes today)  Current Outpatient Prescriptions   Medication Sig Dispense Refill   • doxycycline (VIBRAMYCIN) 100 MG Tab Take 1 Tab by mouth 2 times a day for 7 days. 14 Tab 0   • Pseudoephedrine-DM-GG (SUDAFED COUGH PO) Take  by mouth.     • loratadine (CLARITIN) 10 MG Tab Take 10 mg by mouth every day.     • Ascorbic Acid (VITAMIN C PO) Take  by mouth.     • Multiple Vitamins-Minerals (CENTRUM ADULTS PO) Take  by mouth.       No current facility-administered medications for this visit.      He  has no past medical history on file.    ROS   No chest pain, no shortness of breath, no abdominal pain       Objective:     Blood pressure 110/70, pulse 71, temperature 37.4 °C (99.3 °F), height 1.676 m (5' 6\"), weight 73.4 kg (161 lb 12.8 oz), SpO2 97 %. Body mass index is 26.12 kg/m².   Physical Exam:  General: Alert, oriented and no acute distress.  Eye contact is good, speech goal directed, affect calm  HEENT: conjunctiva non-injected, sclera non-icteric, Mild erythema on oropharynx but no exudate. Mild swelling and erythema on both nasal cavity with watery discharge.  Oral mucous membranes pink and moist with no lesions.  Pinna normal. TM pearly gray.   Neck No supraclavicular, submandibular, submental lymphadenopathy or " masses in the neck or supraclavicular regions.  Lungs: Normal respiratory effort, clear to auscultation bilaterally with good excursion.  CV: regular rate and rhythm. No murmurs.   Abdomen: soft, non distended, nontender, Bowel sound normal.  Ext: no edema, color normal, vascularity normal, temperature normal  Skin exam: Erythematous papular rash on anterior chest wall and back with whiteheads.      Assessment and Plan:   The following treatment plan was discussed     1. Acute nasopharyngitis  - Discussed at length to rinse sinuses with over the counter nasal wash or Netti pot once or twice a day and then use flonase nasal spray once or twice a day after rinsing sinuses  - Advised to try nasal steam therapy.   - Advised to gargle his throat with warm salt water twice a day.  - Advised to increase water intake.  - Advised to limit use of Sudafed. He can take Claritin 10 mg daily.    2. Folliculitis  - Will try doxycycline to take one tablet twice a day for 7 days. Advised to take with probiotics. Reviewed potential side effects of doxycycline with patient.  - Recommend to stop taking doxycycline if he has any side effects or allergic reaction from taking it.  - doxycycline (VIBRAMYCIN) 100 MG Tab; Take 1 Tab by mouth 2 times a day for 7 days.  Dispense: 14 Tab; Refill: 0      Followup: Return if symptoms worsen or fail to improve.      Please note that this dictation was created using voice recognition software. I have made every reasonable attempt to correct obvious errors, but I expect that there may have unintended errors in text, spelling, punctuation, or grammar that I did not discover.

## 2018-03-12 ENCOUNTER — OFFICE VISIT (OUTPATIENT)
Dept: MEDICAL GROUP | Facility: LAB | Age: 43
End: 2018-03-12
Payer: COMMERCIAL

## 2018-03-12 VITALS
RESPIRATION RATE: 12 BRPM | BODY MASS INDEX: 24.64 KG/M2 | DIASTOLIC BLOOD PRESSURE: 72 MMHG | WEIGHT: 157 LBS | HEIGHT: 67 IN | HEART RATE: 60 BPM | OXYGEN SATURATION: 97 % | SYSTOLIC BLOOD PRESSURE: 106 MMHG | TEMPERATURE: 98.2 F

## 2018-03-12 DIAGNOSIS — L73.9 FOLLICULITIS: ICD-10-CM

## 2018-03-12 PROCEDURE — 99214 OFFICE O/P EST MOD 30 MIN: CPT | Performed by: FAMILY MEDICINE

## 2018-03-12 RX ORDER — DOXYCYCLINE HYCLATE 100 MG
100 TABLET ORAL 2 TIMES DAILY
Qty: 6 TAB | Refills: 0 | Status: SHIPPED | OUTPATIENT
Start: 2018-03-12 | End: 2018-03-15

## 2018-03-12 NOTE — PROGRESS NOTES
Subjective:      Nicolas Green is a 42 y.o. male who presents with Rash (was seen on 3/5 for this and taking doxycycline and kenalog/ helping a little not going away X 10 days)    Chief Complaint   Patient presents with   • Rash     was seen on 3/5 for this and taking doxycycline and kenalog/ helping a little not going away X 10 days             HPI     Patient is here with above. Patient states that he started to have a rash on 3/3. He was seen on 3/5 and was diagnosed with folliculitis. He was started on doxycycline 100 mg bid for 7 days. Also told to take probiotics. He also has been applying kenalog and he is not sure that this is helping. Patient is here today for follow up. States he took his last doxycycline this morning.  Thinks that he is geting better, about 60-70% better. Rash is not irritating him as much as far as the itching goes. Started on his torso and then spread. Was torso then back now he has a few spots on the arms and back of the legs. His wife told him that the rash on his back is much better. He is a  and wears layers of clothes at work and does get pretty sweaty at times.  Overall he is feeling much better however he is concerned as the lesions do look the same as how they started and they continue to keep appearing in different spots. He states that he is definitely on the mend. Reports no side effects with the antibiotic. No diarrhea.    He has had hives in the past while in college and also has a history of chicken pox and is making sure this is not the same. He is not taking an antihistamine at this time.     Reports he has not started any new medications prior to the onset of the rash. He's tried no new soaps, detergents, lotions, body products. Reports he has not been in any hot tubs. Reports no insect bites. No nausea. No fever. No SOB    Patient Active Problem List   Diagnosis   • Arthralgia of left hand   • Seasonal allergic rhinitis due to pollen   •  "Seborrheic keratoses   • Concern about skin disease without diagnosis   • Elevated LDL cholesterol level   • Transaminitis   • Acute URI   • Acute nasopharyngitis   • Folliculitis     Family History   Problem Relation Age of Onset   • Arthritis Maternal Grandmother    • Dementia Maternal Grandmother    • Other Mother      Fibromyalgia   • Hyperlipidemia Father    • No Known Problems Brother    • No Known Problems Brother    • Stroke Neg Hx    • Heart Attack Neg Hx    • Cancer Neg Hx      Social History     Social History   • Marital status: Unknown     Spouse name: N/A   • Number of children: N/A   • Years of education: N/A     Occupational History   • Not on file.     Social History Main Topics   • Smoking status: Never Smoker   • Smokeless tobacco: Never Used   • Alcohol use No   • Drug use: No   • Sexual activity: Yes     Partners: Female     Other Topics Concern   • Not on file     Social History Narrative   • No narrative on file       Current Outpatient Prescriptions:   •  doxycycline (VIBRAMYCIN) 100 MG Tab, Take 1 Tab by mouth 2 times a day for 7 days., Disp: 14 Tab, Rfl: 0  •  Pseudoephedrine-DM-GG (SUDAFED COUGH PO), Take  by mouth., Disp: , Rfl:   •  loratadine (CLARITIN) 10 MG Tab, Take 10 mg by mouth every day., Disp: , Rfl:   •  Ascorbic Acid (VITAMIN C PO), Take  by mouth., Disp: , Rfl:   •  Multiple Vitamins-Minerals (CENTRUM ADULTS PO), Take  by mouth., Disp: , Rfl:     ROS       Objective:     /72   Pulse 60   Temp 36.8 °C (98.2 °F)   Resp 12   Ht 1.689 m (5' 6.5\")   Wt 71.2 kg (157 lb)   SpO2 97%   BMI 24.96 kg/m²      Physical Exam   Constitutional: He appears well-developed and well-nourished. He is active and cooperative.  Non-toxic appearance. He does not have a sickly appearance. He does not appear ill. No distress.   HENT:   Head: Normocephalic and atraumatic.   Eyes: Conjunctivae and EOM are normal.   Neurological: He is alert. He is not disoriented. He displays no tremor. He " displays no seizure activity. Coordination and gait normal.   Skin: Skin is warm and dry. He is not diaphoretic. No pallor.   Very few scattered erythematous lesions on the arms, legs    Psychiatric: He has a normal mood and affect. His speech is normal and behavior is normal. He is not actively hallucinating. He is attentive.             Assessment/Plan:       1. Folliculitis  Uncontrolled however improving. Will give another 3 days of antibiotics. Recommend that he start an antihistamine with out decongestant. 6 can continue to use topical Kenalog sparingly if needed twice daily. Follow up with PCP if needed. No SOB, no fever   - doxycycline (VIBRAMYCIN) 100 MG Tab; Take 1 Tab by mouth 2 times a day for 3 days.  Dispense: 6 Tab; Refill: 0    Please note that this dictation was created using voice recognition software. I have made every reasonable attempt to correct obvious errors, but I expect that there are errors of grammar and possibly content that I did not discover before finalizing the note.

## 2018-05-05 ENCOUNTER — PATIENT OUTREACH (OUTPATIENT)
Dept: HEALTH INFORMATION MANAGEMENT | Facility: OTHER | Age: 43
End: 2018-05-05

## 2018-05-05 NOTE — PROGRESS NOTES
1. Attempt #: 1    2. [facility] Verified: yes    3. Verify PCP: yes    5.  Reviewed/Updated the following with patient:       •   Communication Preference Obtained? YES      6. MyChart Activation: already active        9. Care Gap Scheduling (Attempt to Schedule EACH Overdue Care Gap!)     There are no preventive care reminders to display for this patient.     Patient has completed No Call - Was made to the pt for the [employer] project - Health Maintenance is up todate.

## 2018-07-09 ENCOUNTER — OFFICE VISIT (OUTPATIENT)
Dept: MEDICAL GROUP | Facility: MEDICAL CENTER | Age: 43
End: 2018-07-09
Payer: COMMERCIAL

## 2018-07-09 VITALS
TEMPERATURE: 98.6 F | HEIGHT: 66 IN | HEART RATE: 47 BPM | OXYGEN SATURATION: 98 % | BODY MASS INDEX: 23.78 KG/M2 | SYSTOLIC BLOOD PRESSURE: 92 MMHG | WEIGHT: 148 LBS | DIASTOLIC BLOOD PRESSURE: 60 MMHG

## 2018-07-09 DIAGNOSIS — Z82.49 FAMILY HISTORY OF BRAIN ANEURYSM: ICD-10-CM

## 2018-07-09 DIAGNOSIS — J30.1 SEASONAL ALLERGIC RHINITIS DUE TO POLLEN: ICD-10-CM

## 2018-07-09 DIAGNOSIS — E78.00 ELEVATED LDL CHOLESTEROL LEVEL: ICD-10-CM

## 2018-07-09 DIAGNOSIS — Z00.00 ANNUAL PHYSICAL EXAM: ICD-10-CM

## 2018-07-09 DIAGNOSIS — R74.01 TRANSAMINITIS: ICD-10-CM

## 2018-07-09 PROBLEM — Z71.1 CONCERN ABOUT SKIN DISEASE WITHOUT DIAGNOSIS: Status: RESOLVED | Noted: 2017-08-14 | Resolved: 2018-07-09

## 2018-07-09 PROBLEM — J00 ACUTE NASOPHARYNGITIS: Status: RESOLVED | Noted: 2018-03-05 | Resolved: 2018-07-09

## 2018-07-09 PROBLEM — M25.542 ARTHRALGIA OF LEFT HAND: Status: RESOLVED | Noted: 2017-08-14 | Resolved: 2018-07-09

## 2018-07-09 PROBLEM — J06.9 ACUTE URI: Status: RESOLVED | Noted: 2017-10-19 | Resolved: 2018-07-09

## 2018-07-09 PROBLEM — L73.9 FOLLICULITIS: Status: RESOLVED | Noted: 2018-03-05 | Resolved: 2018-07-09

## 2018-07-09 PROCEDURE — 99396 PREV VISIT EST AGE 40-64: CPT | Performed by: FAMILY MEDICINE

## 2018-07-10 NOTE — ASSESSMENT & PLAN NOTE
Patient and I discussed recent labs (see below; elevated LDL alone, uncontrolled) and that ASCVD risk is increased based on most recent lipid panel, current blood pressure (non-hypertensive, without medication), diabetes status (non-diabetic), and smoking status (non-smoker).    Patient and I then discussed necessary dietary changes to make to address dyslipidemia.  Patient is NOT currently taking cholesterol lowering medication.  Patient verbalized understanding.    ROS is NEGATIVE for dizziness, generalized weakness/fatigue, vision/hearing changes, jaw pain/paresthesias, BUE pain/paresthesias/numbness/weakness, chest pain/pressure, palpitations, dyspnea, RUQ abdominal pain, oliguria/anuria, BLE edema.    Lab Results   Component Value Date/Time    CHOLSTRLTOT 184 08/18/2017 06:27 AM     (H) 08/18/2017 06:27 AM    HDL 52 08/18/2017 06:27 AM    TRIGLYCERIDE 76 08/18/2017 06:27 AM

## 2018-07-10 NOTE — ASSESSMENT & PLAN NOTE
Chronic, stable, well-controlled on as needed usage of OTC antihistamines as well as OTC decongestants and cough syrup, all as needed    ROS is negative for increased lacrimation, b/l itchy eyes,  rhinorrhea, post-nasal drip, sneezing, wheezing, dyspnea, respiratory distress, palpitations, tachycardia, generalized pruritis, rash/hives.

## 2018-07-10 NOTE — ASSESSMENT & PLAN NOTE
Chronic, stable, will control per symptoms.    Patient denies binge or regular&heavy alcohol drinking behaviors, denies IV drug use, denies recent sexual intercourse with new partners.    ROS is NEGATIVE for generalized weakness/fatigue, generalized pruritis, jaundice, RUQ pain.

## 2018-07-10 NOTE — ASSESSMENT & PLAN NOTE
Chronic, unknown control, patient states that his father was diagnosed with a ruptured brain aneurysm upon postmortem autopsy.  Patient's father was otherwise asymptomatic prior to that time.  Patient and I did review the signs and symptoms of hemorrhagic CVA as well as hypertensive emergency.  Patient denies any of the symptoms in both his father as well as himself at present time.    Of note, patient was previously examined by neurosurgery with a CT of the head, determined to be within normal limits, and has since had a gap in follow-up.  Patient is wondering if this is something worthwhile to pursue.  I described to the patient that since his father did not exhibit symptoms until the early 60s, general recommendations might be to resume evaluation when patient enters into his 50s.      However, due to patient's concern about father's asymptomatic status prior to onset of brain aneurysm rupture, I do think it is reasonable for him to have consultation with neurosurgery.    ROS is NEGATIVE for dizziness, generalized weakness/fatigue, cold sweats, dizziness,  vision/hearing changes, jaw pain/paresthesias, BUE pain/paresthesias/numbness/weakness, chest pain/pressure, palpitations, dyspnea, nausea, RUQ abdominal pain, oliguria/anuria, BLE edema.     ROS is NEGATIVE for confusion, altered mentation, word finding difficulty, memory loss, diplopia, visual scotomas, facial droop, dysarthria, dysphagia, hemiplegia, gait instability, new/abnormal paresthesias/numbness.

## 2018-07-10 NOTE — PROGRESS NOTES
Subjective:   Chief Complaint/History of Present Illness:  Nicolas Green is a 42 y.o. male established who presents today for Annual Medical exam, to review the following medical problems.      Diagnoses of Annual physical exam, Family history of brain aneurysm, Seasonal allergic rhinitis due to pollen, Transaminitis, and Elevated LDL cholesterol level were pertinent to this visit.    PMH, PSH, Social History, Medications, Allergies, FMH all reviewed as documented:    Family history of brain aneurysm  Chronic, unknown control, patient states that his father was diagnosed with a ruptured brain aneurysm upon postmortem autopsy.  Patient's father was otherwise asymptomatic prior to that time.  Patient and I did review the signs and symptoms of hemorrhagic CVA as well as hypertensive emergency.  Patient denies any of the symptoms in both his father as well as himself at present time.    Of note, patient was previously examined by neurosurgery with a CT of the head, determined to be within normal limits, and has since had a gap in follow-up.  Patient is wondering if this is something worthwhile to pursue.  I described to the patient that since his father did not exhibit symptoms until the early 60s, general recommendations might be to resume evaluation when patient enters into his 50s.      However, due to patient's concern about father's asymptomatic status prior to onset of brain aneurysm rupture, I do think it is reasonable for him to have consultation with neurosurgery.    ROS is NEGATIVE for dizziness, generalized weakness/fatigue, cold sweats, dizziness,  vision/hearing changes, jaw pain/paresthesias, BUE pain/paresthesias/numbness/weakness, chest pain/pressure, palpitations, dyspnea, nausea, RUQ abdominal pain, oliguria/anuria, BLE edema.     ROS is NEGATIVE for confusion, altered mentation, word finding difficulty, memory loss, diplopia, visual scotomas, facial droop, dysarthria, dysphagia, hemiplegia,  gait instability, new/abnormal paresthesias/numbness.     Seasonal allergic rhinitis due to pollen  Chronic, stable, well-controlled on as needed usage of OTC antihistamines as well as OTC decongestants and cough syrup, all as needed    ROS is negative for increased lacrimation, b/l itchy eyes,  rhinorrhea, post-nasal drip, sneezing, wheezing, dyspnea, respiratory distress, palpitations, tachycardia, generalized pruritis, rash/hives.     Transaminitis  Chronic, stable, will control per symptoms.    Patient denies binge or regular&heavy alcohol drinking behaviors, denies IV drug use, denies recent sexual intercourse with new partners.    ROS is NEGATIVE for generalized weakness/fatigue, generalized pruritis, jaundice, RUQ pain.      Elevated LDL cholesterol level  Patient and I discussed recent labs (see below; elevated LDL alone, uncontrolled) and that ASCVD risk is increased based on most recent lipid panel, current blood pressure (non-hypertensive, without medication), diabetes status (non-diabetic), and smoking status (non-smoker).    Patient and I then discussed necessary dietary changes to make to address dyslipidemia.  Patient is NOT currently taking cholesterol lowering medication.  Patient verbalized understanding.    ROS is NEGATIVE for dizziness, generalized weakness/fatigue, vision/hearing changes, jaw pain/paresthesias, BUE pain/paresthesias/numbness/weakness, chest pain/pressure, palpitations, dyspnea, RUQ abdominal pain, oliguria/anuria, BLE edema.    Lab Results   Component Value Date/Time    CHOLSTRLTOT 184 08/18/2017 06:27 AM     (H) 08/18/2017 06:27 AM    HDL 52 08/18/2017 06:27 AM    TRIGLYCERIDE 76 08/18/2017 06:27 AM       Patient Active Problem List    Diagnosis Date Noted   • Family history of brain aneurysm 07/09/2018   • Elevated LDL cholesterol level 08/28/2017   • Transaminitis 08/28/2017   • Seasonal allergic rhinitis due to pollen 08/14/2017   • Seborrheic keratoses 08/14/2017  "      Additional History:   Allergies:    Patient has no known allergies.     Medications:     Current Outpatient Prescriptions Ordered in [facility]   Medication Sig Dispense Refill   • Pseudoephedrine-DM-GG (SUDAFED COUGH PO) Take  by mouth.     • loratadine (CLARITIN) 10 MG Tab Take 10 mg by mouth every day.     • Ascorbic Acid (VITAMIN C PO) Take  by mouth.     • Multiple Vitamins-Minerals (CENTRUM ADULTS PO) Take  by mouth.       No current Epic-ordered facility-administered medications on file.         Past Medical History:   No past medical history on file.     Past Surgical History:   No past surgical history on file.     Social History:     Social History   Substance Use Topics   • Smoking status: Never Smoker   • Smokeless tobacco: Never Used   • Alcohol use No        Family History:     Family Status   Relation Status   • Maternal Grandmother Alive   • Mother Alive   • Father  at age Brain aneurysm       • Brother Alive   • Brother Alive   • Maternal Grandfather    • Paternal Grandmother    • Paternal Grandfather    • Neg Hx         Family History   Problem Relation Age of Onset   • Arthritis Maternal Grandmother      OA   • Dementia Maternal Grandmother    • Hyperlipidemia Maternal Grandmother    • Other Mother      Fibromyalgia   • Arthritis Mother      OA   • Hyperlipidemia Father    • No Known Problems Brother    • No Known Problems Brother    • Stroke Neg Hx    • Heart Attack Neg Hx    • Cancer Neg Hx        ROS:     - NOTE: All other systems reviewed and are negative, except as in HPI.     Objective:   Physical Exam:    Vitals: Blood pressure (!) 92/60, pulse (!) 47, temperature 37 °C (98.6 °F), height 1.676 m (5' 6\"), weight 67.1 kg (148 lb), SpO2 98 %.   BMI: Body mass index is 23.89 kg/m².   General/Constitutional: Vitals as above, Well nourished, well developed male in no acute distress   Head/Eyes:  - Head is grossly normal & atraumatic  - Bilateral conjunctivae " clear and not injected, bilateral EOMI, bilateral PERRL   ENT:   - Bilateral external ears grossly normal in appearance, hearing grossly intact  - External nares normal in appearance and without discharge/bleeding, bilateral turbinates non-erythematous/non-edematous and without discharge/bleeding  - Good dentition,  posterior oropharynx without erythema/edema/exudates  Neck: Neck supple, no masses, neck non-tender to palpation, no thyromegaly/goiter   Respiratory: No respiratory distress, bilateral lungs are clear to auscultation in all lung fields (anterior/lateral/posterior), no wheezing/rhonchi/rales   Cardiovascular: Regular rate and rhythm without murmur/gallops/rubs,distal pulses equal and 2+ bilaterally (radial, posterior tibial), no bilateral lower extremity edema   MSK: Gait grossly normal & not antalgic, no tenderness to percussion of vertebral processes, no CVAT, no bilateral SI joint tenderness   Integumentary: No apparent rashes   Neuro: Gross motor movement intact in all 4 extremities, Gross sensation intact to extremities and trunk, Gait grossly normal and not antalgic   Psych: Judgment grossly appropriate, no apparent depression/anxiety    Health Maintenance:     - Reviewed and found to be up-to-date    Imaging/Labs:     - 08/19/17 -- , o/w Lipid Profile WNL, CMP WNL, A1c WNL    Assessment and Plan:   1. Annual physical exam  Patient in relatively good health overall, apart from LDL elevation, and concerns regarding brain aneurysm.   - HEMOGLOBIN A1C; Future   - COMP METABOLIC PANEL; Future   - LIPID PROFILE; Future    2. Family history of brain aneurysm  New problem for medical evaluation to me, uncontrolled, referral to neurosurgery for further consultation/evaluation/management.  In the meantime, we did discuss the evaluation of possible comorbidities such as prediabetes/diabetes, hyperlipidemia, as well as hepatic or renal dysfunction.  These could increase his risk of hypertension and  atherosclerotic disease.   - REFERRAL TO NEUROSURGERY   - HEMOGLOBIN A1C; Future   - COMP METABOLIC PANEL; Future   - LIPID PROFILE; Future    3. Seasonal allergic rhinitis due to pollen  Chronic, stable, well-controlled.  Continue as needed usage of OTC antihistamines/nasal decongestant/cough syrups    4. Transaminitis  Chronic, stable, well-controlled per symptoms, however unknown control.  Labs, CMP to evaluate.   - COMP METABOLIC PANEL; Future    5. Elevated LDL cholesterol level  Chronic, stable, well-controlled per symptoms, however unknown control.  Labs, Lipid profile to evaluate.   - LIPID PROFILE; Future      RTC: in 1 year for Annual Medical Exam.    PLEASE NOTE: This dictation was created using voice recognition software. I have made every reasonable attempt to correct obvious errors, but I expect that there are errors of grammar and possibly content that I did not discover before finalizing the note.

## 2018-07-23 ENCOUNTER — HOSPITAL ENCOUNTER (OUTPATIENT)
Dept: LAB | Facility: MEDICAL CENTER | Age: 43
End: 2018-07-23
Attending: FAMILY MEDICINE
Payer: COMMERCIAL

## 2018-07-23 DIAGNOSIS — Z00.00 ANNUAL PHYSICAL EXAM: ICD-10-CM

## 2018-07-23 DIAGNOSIS — E78.00 ELEVATED LDL CHOLESTEROL LEVEL: ICD-10-CM

## 2018-07-23 DIAGNOSIS — R74.01 TRANSAMINITIS: ICD-10-CM

## 2018-07-23 DIAGNOSIS — Z82.49 FAMILY HISTORY OF BRAIN ANEURYSM: ICD-10-CM

## 2018-07-23 LAB
ALBUMIN SERPL BCP-MCNC: 4.9 G/DL (ref 3.2–4.9)
ALBUMIN/GLOB SERPL: 2.3 G/DL
ALP SERPL-CCNC: 97 U/L (ref 30–99)
ALT SERPL-CCNC: 41 U/L (ref 2–50)
ANION GAP SERPL CALC-SCNC: 8 MMOL/L (ref 0–11.9)
AST SERPL-CCNC: 28 U/L (ref 12–45)
BILIRUB SERPL-MCNC: 1.1 MG/DL (ref 0.1–1.5)
BUN SERPL-MCNC: 26 MG/DL (ref 8–22)
CALCIUM SERPL-MCNC: 9.9 MG/DL (ref 8.5–10.5)
CHLORIDE SERPL-SCNC: 106 MMOL/L (ref 96–112)
CHOLEST SERPL-MCNC: 194 MG/DL (ref 100–199)
CO2 SERPL-SCNC: 27 MMOL/L (ref 20–33)
CREAT SERPL-MCNC: 1.2 MG/DL (ref 0.5–1.4)
GLOBULIN SER CALC-MCNC: 2.1 G/DL (ref 1.9–3.5)
GLUCOSE SERPL-MCNC: 92 MG/DL (ref 65–99)
HDLC SERPL-MCNC: 48 MG/DL
LDLC SERPL CALC-MCNC: 120 MG/DL
POTASSIUM SERPL-SCNC: 4.6 MMOL/L (ref 3.6–5.5)
PROT SERPL-MCNC: 7 G/DL (ref 6–8.2)
SODIUM SERPL-SCNC: 141 MMOL/L (ref 135–145)
TRIGL SERPL-MCNC: 130 MG/DL (ref 0–149)

## 2018-07-23 PROCEDURE — 80053 COMPREHEN METABOLIC PANEL: CPT

## 2018-07-23 PROCEDURE — 36415 COLL VENOUS BLD VENIPUNCTURE: CPT

## 2018-07-23 PROCEDURE — 83036 HEMOGLOBIN GLYCOSYLATED A1C: CPT

## 2018-07-23 PROCEDURE — 80061 LIPID PANEL: CPT

## 2018-07-24 LAB
EST. AVERAGE GLUCOSE BLD GHB EST-MCNC: 105 MG/DL
HBA1C MFR BLD: 5.3 % (ref 0–5.6)

## 2018-08-27 ENCOUNTER — HOSPITAL ENCOUNTER (OUTPATIENT)
Dept: RADIOLOGY | Facility: MEDICAL CENTER | Age: 43
End: 2018-08-27
Attending: NEUROLOGICAL SURGERY
Payer: COMMERCIAL

## 2018-08-27 DIAGNOSIS — Z82.49 FAMILY HISTORY OF ISCHEMIC HEART DISEASE: ICD-10-CM

## 2018-08-27 PROCEDURE — 70544 MR ANGIOGRAPHY HEAD W/O DYE: CPT

## 2019-01-11 ENCOUNTER — OFFICE VISIT (OUTPATIENT)
Dept: MEDICAL GROUP | Facility: LAB | Age: 44
End: 2019-01-11
Payer: COMMERCIAL

## 2019-01-11 ENCOUNTER — HOSPITAL ENCOUNTER (OUTPATIENT)
Dept: LAB | Facility: MEDICAL CENTER | Age: 44
End: 2019-01-11
Attending: INTERNAL MEDICINE
Payer: COMMERCIAL

## 2019-01-11 VITALS
WEIGHT: 160 LBS | RESPIRATION RATE: 18 BRPM | BODY MASS INDEX: 25.71 KG/M2 | DIASTOLIC BLOOD PRESSURE: 72 MMHG | SYSTOLIC BLOOD PRESSURE: 112 MMHG | OXYGEN SATURATION: 98 % | HEART RATE: 70 BPM | TEMPERATURE: 98.2 F | HEIGHT: 66 IN

## 2019-01-11 DIAGNOSIS — R10.13 EPIGASTRIC PAIN: ICD-10-CM

## 2019-01-11 DIAGNOSIS — R10.13 EPIGASTRIC PAIN: Primary | ICD-10-CM

## 2019-01-11 LAB
ALBUMIN SERPL BCP-MCNC: 4.8 G/DL (ref 3.2–4.9)
ALBUMIN/GLOB SERPL: 2.4 G/DL
ALP SERPL-CCNC: 92 U/L (ref 30–99)
ALT SERPL-CCNC: 46 U/L (ref 2–50)
ANION GAP SERPL CALC-SCNC: 7 MMOL/L (ref 0–11.9)
AST SERPL-CCNC: 32 U/L (ref 12–45)
BASOPHILS # BLD AUTO: 1 % (ref 0–1.8)
BASOPHILS # BLD: 0.06 K/UL (ref 0–0.12)
BILIRUB SERPL-MCNC: 1 MG/DL (ref 0.1–1.5)
BUN SERPL-MCNC: 25 MG/DL (ref 8–22)
CALCIUM SERPL-MCNC: 10.1 MG/DL (ref 8.5–10.5)
CHLORIDE SERPL-SCNC: 104 MMOL/L (ref 96–112)
CO2 SERPL-SCNC: 28 MMOL/L (ref 20–33)
CREAT SERPL-MCNC: 1.33 MG/DL (ref 0.5–1.4)
EOSINOPHIL # BLD AUTO: 0.26 K/UL (ref 0–0.51)
EOSINOPHIL NFR BLD: 4.2 % (ref 0–6.9)
ERYTHROCYTE [DISTWIDTH] IN BLOOD BY AUTOMATED COUNT: 36.3 FL (ref 35.9–50)
GLOBULIN SER CALC-MCNC: 2 G/DL (ref 1.9–3.5)
GLUCOSE SERPL-MCNC: 88 MG/DL (ref 65–99)
HCT VFR BLD AUTO: 46.7 % (ref 42–52)
HGB BLD-MCNC: 16.6 G/DL (ref 14–18)
IMM GRANULOCYTES # BLD AUTO: 0.02 K/UL (ref 0–0.11)
IMM GRANULOCYTES NFR BLD AUTO: 0.3 % (ref 0–0.9)
LIPASE SERPL-CCNC: 28 U/L (ref 11–82)
LYMPHOCYTES # BLD AUTO: 1.8 K/UL (ref 1–4.8)
LYMPHOCYTES NFR BLD: 29 % (ref 22–41)
MCH RBC QN AUTO: 30.8 PG (ref 27–33)
MCHC RBC AUTO-ENTMCNC: 35.5 G/DL (ref 33.7–35.3)
MCV RBC AUTO: 86.6 FL (ref 81.4–97.8)
MONOCYTES # BLD AUTO: 0.6 K/UL (ref 0–0.85)
MONOCYTES NFR BLD AUTO: 9.7 % (ref 0–13.4)
NEUTROPHILS # BLD AUTO: 3.46 K/UL (ref 1.82–7.42)
NEUTROPHILS NFR BLD: 55.8 % (ref 44–72)
NRBC # BLD AUTO: 0 K/UL
NRBC BLD-RTO: 0 /100 WBC
PLATELET # BLD AUTO: 193 K/UL (ref 164–446)
PMV BLD AUTO: 10.5 FL (ref 9–12.9)
POTASSIUM SERPL-SCNC: 4.3 MMOL/L (ref 3.6–5.5)
PROT SERPL-MCNC: 6.8 G/DL (ref 6–8.2)
RBC # BLD AUTO: 5.39 M/UL (ref 4.7–6.1)
SODIUM SERPL-SCNC: 139 MMOL/L (ref 135–145)
WBC # BLD AUTO: 6.2 K/UL (ref 4.8–10.8)

## 2019-01-11 PROCEDURE — 80053 COMPREHEN METABOLIC PANEL: CPT

## 2019-01-11 PROCEDURE — 85025 COMPLETE CBC W/AUTO DIFF WBC: CPT

## 2019-01-11 PROCEDURE — 99214 OFFICE O/P EST MOD 30 MIN: CPT | Performed by: INTERNAL MEDICINE

## 2019-01-11 PROCEDURE — 36415 COLL VENOUS BLD VENIPUNCTURE: CPT

## 2019-01-11 PROCEDURE — 83690 ASSAY OF LIPASE: CPT

## 2019-01-11 RX ORDER — OMEPRAZOLE 20 MG/1
20 CAPSULE, DELAYED RELEASE ORAL DAILY
Qty: 30 CAP | Refills: 0 | Status: SHIPPED | OUTPATIENT
Start: 2019-01-11 | End: 2019-02-10

## 2019-01-11 ASSESSMENT — PATIENT HEALTH QUESTIONNAIRE - PHQ9: CLINICAL INTERPRETATION OF PHQ2 SCORE: 0

## 2019-01-12 NOTE — PROGRESS NOTES
Chief Complaint   Patient presents with   • GI Problem     x4days with cramping       Subjective:     HPI:   Nicolas presents today with the following.    Epigastric pain  New to discuss, uncontrolled problem.  He reported 4 days history of intermittent epigastric pain.  Pain comes on while he is resting, frequently at the end of the day or wakes him up from sleep at 3 in the morning.  Does not have been while he is around and exercising and has no relation to food.  Denies any associated lack of appetite, nausea, vomiting, changes to his bowel habits such as diarrhea or constipation.  His bowel habits has been regular and his last bowel movement this morning was normal in color.  Denies any GI bleeding or melena.  He stated that the pain does not radiate, mostly crampy in nature, it continues for couple of hours at a time and resolves spontaneously.  He tries over-the-counter Gas-X without improvement.  He denies any preceding fever or chills/flulike symptoms.  Denies changes to his urinary symptoms.  He is does not normally drink alcohol but has 2 cranberries and whiskey couple of weeks ago.    No previous similar episode.  He did report history of IBS many years ago but mainly lower GI symptoms.          Patient Active Problem List    Diagnosis Date Noted   • Epigastric pain 01/11/2019   • Family history of brain aneurysm 07/09/2018   • Elevated LDL cholesterol level 08/28/2017   • Transaminitis 08/28/2017   • Seasonal allergic rhinitis due to pollen 08/14/2017   • Seborrheic keratoses 08/14/2017       Current Outpatient Prescriptions   Medication Sig Dispense Refill   • omeprazole (PRILOSEC) 20 MG delayed-release capsule Take 1 Cap by mouth every day for 30 days. 30 Cap 0   • Pseudoephedrine-DM-GG (SUDAFED COUGH PO) Take  by mouth.     • loratadine (CLARITIN) 10 MG Tab Take 10 mg by mouth every day.     • Ascorbic Acid (VITAMIN C PO) Take  by mouth.     • Multiple Vitamins-Minerals (CENTRUM ADULTS PO) Take  by  "mouth.       No current facility-administered medications for this visit.        Allergies as of 01/11/2019   • (No Known Allergies)        No past medical history on file.    No past surgical history on file.    Social History   Substance Use Topics   • Smoking status: Never Smoker   • Smokeless tobacco: Never Used   • Alcohol use No       Family History   Problem Relation Age of Onset   • Arthritis Maternal Grandmother         OA   • Dementia Maternal Grandmother    • Hyperlipidemia Maternal Grandmother    • Other Mother         Fibromyalgia   • Arthritis Mother         OA   • Hyperlipidemia Father    • No Known Problems Brother    • No Known Problems Brother    • Stroke Neg Hx    • Heart Attack Neg Hx    • Cancer Neg Hx        ROS:     - Constitutional: Negative for fever, chills, unexpected weight change, and fatigue/generalized weakness.     - HEENT: Negative for headaches, vision changes, hearing changes, ear pain, ear discharge, sinus congestion, or sore throat.     - Respiratory: Negative for cough, sputum production, dyspnea and wheezing.    - Cardiovascular: Negative for chest pain or palpitations.      - Gastrointestinal: Per HPI.    - Genitourinary: Negative for dysuria, polyuria or urinary urgency.    - Musculoskeletal: Negative for myalgias, back pain, and joint pain.     - Skin: Negative for rash, itching, cyanotic skin color change.     - Psychiatric/Behavioral: Negative for depression or suicidal/homicidal ideation.       Physical Exam:     Blood pressure 112/72, pulse 70, temperature 36.8 °C (98.2 °F), temperature source Temporal, resp. rate 18, height 1.676 m (5' 6\"), weight 72.6 kg (160 lb), SpO2 98 %. Body mass index is 25.82 kg/m².   Gen:         Alert and oriented, No apparent distress.  Neck:        No Lymphadenopathy or Bruits.  Lungs:     Clear to auscultation bilaterally  CV:          Regular rate and rhythm. No murmurs, rubs or gallops.          Abdomen:   Soft, nontender, positive bowel " sounds, no hepatosplenomegaly.  No epigastric tenderness.       Ext:          No clubbing, cyanosis, edema.      Assessment and Plan:     43 y.o. male with the following issues.    1. Epigastric pain  New, uncontrolled problem.  4 days of on and off epigastric pain, abdominal exam was very benign.  Differential includes but not limited to gastritis, gallstones or mild pancreatitis.  Did have a recent alcohol drinking but no NSAIDs use.  Will proceed with initial investigations CBC, CMP and lipase level.  We will also order an ultrasound.  Discussed a trial of PPI at this time.  Discussed worsening symptoms that require urgent medical attention over the weekend.  Follow-up next week if no improvement.    - CBC WITH DIFFERENTIAL; Future  - COMP METABOLIC PANEL; Future  - US-RUQ; Future  - LIPASE; Future  - omeprazole (PRILOSEC) 20 MG delayed-release capsule; Take 1 Cap by mouth every day for 30 days.  Dispense: 30 Cap; Refill: 0      Health Maintenance:   Completed.    There are no preventive care reminders to display for this patient.    Follow Up:      Return for PRN with PCP.      Please note that this dictation was created using voice recognition software. I have made every reasonable attempt to correct obvious errors, but I expect that there are errors of grammar and possibly content that I did not discover before finalizing the note.    Signed by: Mackenzie Sanchez M.D.

## 2019-06-03 ENCOUNTER — PATIENT MESSAGE (OUTPATIENT)
Dept: MEDICAL GROUP | Facility: MEDICAL CENTER | Age: 44
End: 2019-06-03

## 2019-06-03 DIAGNOSIS — R74.01 TRANSAMINITIS: ICD-10-CM

## 2019-06-03 DIAGNOSIS — Z00.00 ANNUAL PHYSICAL EXAM: ICD-10-CM

## 2019-06-03 DIAGNOSIS — Z13.1 DIABETES MELLITUS SCREENING: ICD-10-CM

## 2019-06-03 DIAGNOSIS — E78.00 ELEVATED LDL CHOLESTEROL LEVEL: ICD-10-CM

## 2019-06-06 PROBLEM — Z00.00 ANNUAL PHYSICAL EXAM: Status: ACTIVE | Noted: 2019-06-06

## 2019-06-06 PROBLEM — Z13.1 DIABETES MELLITUS SCREENING: Status: ACTIVE | Noted: 2019-06-06

## 2019-07-03 ENCOUNTER — HOSPITAL ENCOUNTER (OUTPATIENT)
Dept: LAB | Facility: MEDICAL CENTER | Age: 44
End: 2019-07-03
Attending: FAMILY MEDICINE
Payer: COMMERCIAL

## 2019-07-03 DIAGNOSIS — Z00.00 ANNUAL PHYSICAL EXAM: ICD-10-CM

## 2019-07-03 DIAGNOSIS — E78.00 ELEVATED LDL CHOLESTEROL LEVEL: ICD-10-CM

## 2019-07-03 DIAGNOSIS — R74.01 TRANSAMINITIS: ICD-10-CM

## 2019-07-03 DIAGNOSIS — Z13.1 DIABETES MELLITUS SCREENING: ICD-10-CM

## 2019-07-03 LAB
ALBUMIN SERPL BCP-MCNC: 4.5 G/DL (ref 3.2–4.9)
ALBUMIN/GLOB SERPL: 2.1 G/DL
ALP SERPL-CCNC: 94 U/L (ref 30–99)
ALT SERPL-CCNC: 43 U/L (ref 2–50)
ANION GAP SERPL CALC-SCNC: 9 MMOL/L (ref 0–11.9)
AST SERPL-CCNC: 30 U/L (ref 12–45)
BILIRUB SERPL-MCNC: 0.8 MG/DL (ref 0.1–1.5)
BUN SERPL-MCNC: 25 MG/DL (ref 8–22)
CALCIUM SERPL-MCNC: 9.4 MG/DL (ref 8.5–10.5)
CHLORIDE SERPL-SCNC: 107 MMOL/L (ref 96–112)
CHOLEST SERPL-MCNC: 197 MG/DL (ref 100–199)
CO2 SERPL-SCNC: 27 MMOL/L (ref 20–33)
CREAT SERPL-MCNC: 1.47 MG/DL (ref 0.5–1.4)
EST. AVERAGE GLUCOSE BLD GHB EST-MCNC: 108 MG/DL
FASTING STATUS PATIENT QL REPORTED: NORMAL
GLOBULIN SER CALC-MCNC: 2.1 G/DL (ref 1.9–3.5)
GLUCOSE SERPL-MCNC: 94 MG/DL (ref 65–99)
HBA1C MFR BLD: 5.4 % (ref 0–5.6)
HDLC SERPL-MCNC: 44 MG/DL
LDLC SERPL CALC-MCNC: 125 MG/DL
POTASSIUM SERPL-SCNC: 3.7 MMOL/L (ref 3.6–5.5)
PROT SERPL-MCNC: 6.6 G/DL (ref 6–8.2)
SODIUM SERPL-SCNC: 143 MMOL/L (ref 135–145)
TRIGL SERPL-MCNC: 140 MG/DL (ref 0–149)

## 2019-07-03 PROCEDURE — 80053 COMPREHEN METABOLIC PANEL: CPT

## 2019-07-03 PROCEDURE — 83036 HEMOGLOBIN GLYCOSYLATED A1C: CPT

## 2019-07-03 PROCEDURE — 36415 COLL VENOUS BLD VENIPUNCTURE: CPT

## 2019-07-03 PROCEDURE — 80061 LIPID PANEL: CPT

## 2019-07-08 ENCOUNTER — OFFICE VISIT (OUTPATIENT)
Dept: MEDICAL GROUP | Facility: MEDICAL CENTER | Age: 44
End: 2019-07-08
Payer: COMMERCIAL

## 2019-07-08 VITALS
HEIGHT: 66 IN | HEART RATE: 55 BPM | RESPIRATION RATE: 14 BRPM | OXYGEN SATURATION: 98 % | TEMPERATURE: 97.9 F | SYSTOLIC BLOOD PRESSURE: 110 MMHG | BODY MASS INDEX: 25.23 KG/M2 | WEIGHT: 156.97 LBS | DIASTOLIC BLOOD PRESSURE: 62 MMHG

## 2019-07-08 DIAGNOSIS — L82.1 SEBORRHEIC KERATOSES: ICD-10-CM

## 2019-07-08 DIAGNOSIS — E78.00 ELEVATED LDL CHOLESTEROL LEVEL: ICD-10-CM

## 2019-07-08 DIAGNOSIS — R10.13 EPIGASTRIC PAIN: ICD-10-CM

## 2019-07-08 DIAGNOSIS — R74.01 TRANSAMINITIS: ICD-10-CM

## 2019-07-08 DIAGNOSIS — Z00.00 ANNUAL PHYSICAL EXAM: Primary | ICD-10-CM

## 2019-07-08 DIAGNOSIS — N17.9 ACUTE KIDNEY INJURY (HCC): ICD-10-CM

## 2019-07-08 DIAGNOSIS — J30.1 SEASONAL ALLERGIC RHINITIS DUE TO POLLEN: ICD-10-CM

## 2019-07-08 PROBLEM — Z13.1 DIABETES MELLITUS SCREENING: Status: RESOLVED | Noted: 2019-06-06 | Resolved: 2019-07-08

## 2019-07-08 PROCEDURE — 99396 PREV VISIT EST AGE 40-64: CPT | Performed by: FAMILY MEDICINE

## 2019-07-08 NOTE — PATIENT INSTRUCTIONS
"Dr. Marie's tips for \"Lifestyle Medicine:\"     Check out the talk/documentary on \"How not to die\" by Dr. Chito Red (on his website nutritionfacts.org, he also authored a book with this title).       1) Make SMART lifestyle changes: Specific, Measurable, Attainable, Relevant, Time-sensitive.  The lifestyle changes that you need to make are with regards to: nutrition, cardiovascular exercise, sleep, stress management.  Make these changes every 2 weeks, revisiting the previous goals and perhaps revising them and/or setting new ones.       2) Nutrition: Make as many changes as you can to increase the amount of whole-foods (not Whole Foods, necessarily!  ;-)), plant-based diet as possible:   A) Books: Eat to Live (Dr. Andrea Kerns), The Spectrum (Dr. Bruce Jc), The Starch Solution (Dr. Andrew Le)      B) Documentaries (can usually be found on [facility]): [place].  Fat, Sick, and Nearly Dead.  Fed Up.           3) Cardiovascular Exercise: The center for disease control recommends a minimum of 150 minutes per week of moderate intensity cardiovascular exercise for weight maintenance and cardiovascular health.  Set this as your initial goal, with at least 30 minutes per session. Types of exercise can include 30 minutes of elliptical, 30 minutes of decently fast jog, 30 minutes of swimming, 30 minutes of heavy gardening (lifting big bags of fertilizer, digging deep holes/ditches).  He can cut down the minute requirements to half, by doing higher intensity sports such as a game of tennis, or soccer.  He notes the library and check out with they have for home exercise programs, as well.       4) Sleep:    A) Goal: Obtain a minimum of 7-8hours of continuous, uninterrupted, restful sleep per night.    B) Tips for Sleep Hygiene:    I) Go to bed and wake up at consistent times whether work/school day or not.     II) Keep room dark, quiet, and comfortable.  Increase exposure to sunlight during awake times and " avoid bright lights (especially anything with a backlight) at least the last 1-2hours before going to sleep.     III) Don't nap.     IV) Avoid stimulant or caffeine use more than 4 hours after wake time.        5) Stress Management: You cannot change the stresses of life dizziness necessarily, but you can change how he responds of them. One good way to manage stress is to write things down in order to help you process how to approach things in general or specifically. Another good way is to talk it out with someone you trusts, specifically your significant other or good friend. A definite great way to deal with stress is to have cardiovascular exercise!

## 2019-07-08 NOTE — ASSESSMENT & PLAN NOTE
Chronic, stable, well-controlled, taking medication as directed.     ROS is NEGATIVE for increased lacrimation, b/l itchy eyes,  rhinorrhea, post-nasal drip, sneezing, wheezing, dyspnea, respiratory distress, palpitations, tachycardia, generalized pruritis, rash/hives.

## 2019-07-08 NOTE — PROGRESS NOTES
Subjective:   Chief Complaint/History of Present Illness:  Nicolas Green is a 43 y.o. male established who presents today for Annual Medical exam, to review the following medical problems.      The primary encounter diagnosis was Annual physical exam. Diagnoses of Acute kidney injury (HCC), Elevated LDL cholesterol level, Seborrheic keratoses, Seasonal allergic rhinitis due to pollen, Epigastric pain, and Transaminitis were also pertinent to this visit.    PMH, PSH, Social History, Medications, Allergies, FMH all reviewed as documented:    Elevated LDL cholesterol level  Patient and I discussed recent labs (see below; LDL elevation) and that ASCVD risk is increased based on most recent lipid panel, current blood pressure (non-hypertensive), diabetes status (non-diabetic), and smoking status (non-smoker).    Patient and I then discussed necessary dietary changes to make to address dyslipidemia.  Patient is NOT currently taking cholesterol lowering medication.  Patient verbalized understanding.    ROS is NEGATIVE for dizziness, generalized weakness/fatigue, vision/hearing changes, jaw pain/paresthesias, BUE pain/paresthesias/numbness/weakness, chest pain/pressure, palpitations, dyspnea, RUQ abdominal pain, oliguria/anuria, BLE edema.    Lab Results   Component Value Date/Time    CHOLSTRLTOT 197 07/03/2019 06:05 AM     (H) 07/03/2019 06:05 AM    HDL 44 07/03/2019 06:05 AM    TRIGLYCERIDE 140 07/03/2019 06:05 AM       Seborrheic keratoses  Chronic, stable, well-controlled.  Not of concern at present time.    Seasonal allergic rhinitis due to pollen  Chronic, stable, well-controlled, taking medication as directed.     ROS is NEGATIVE for increased lacrimation, b/l itchy eyes,  rhinorrhea, post-nasal drip, sneezing, wheezing, dyspnea, respiratory distress, palpitations, tachycardia, generalized pruritis, rash/hives.     (HCC) Acute kidney injury  New problem, uncontrolled, determined by recent labs.   This is likely secondary to dehydration.  Therefore, patient has been asked to work on this, can get labs in 1 month or more.    Epigastric pain  Resolved.    Transaminitis  Resolved.      Patient Active Problem List    Diagnosis Date Noted   • (HCC) Acute kidney injury 2019   • Family history of brain aneurysm 2018   • Elevated LDL cholesterol level 2017   • Seasonal allergic rhinitis due to pollen 2017   • Seborrheic keratoses 2017       Additional History:   Allergies:    Patient has no known allergies.     Medications:     Current Outpatient Prescriptions Ordered in [facility]   Medication Sig Dispense Refill   • loratadine (CLARITIN) 10 MG Tab Take 10 mg by mouth every day.     • Ascorbic Acid (VITAMIN C PO) Take  by mouth.     • Multiple Vitamins-Minerals (CENTRUM ADULTS PO) Take  by mouth.       No current Epic-ordered facility-administered medications on file.         Past Medical History:   No past medical history on file.     Past Surgical History:   No past surgical history on file.     Social History:     Social History   Substance Use Topics   • Smoking status: Never Smoker   • Smokeless tobacco: Never Used   • Alcohol use No        Family History:     Family Status   Relation Status   • MGMo Alive   • Mo Alive   • Fa  at age Brain aneurysm           • Bro Alive   • Bro Alive   • MGFa    • PGMo    • PGFa    • Neg Hx (Not Specified)        Family History   Problem Relation Age of Onset   • Arthritis Maternal Grandmother         OA   • Dementia Maternal Grandmother    • Hyperlipidemia Maternal Grandmother    • Other Mother         Fibromyalgia   • Arthritis Mother         OA   • Hyperlipidemia Father    • No Known Problems Brother    • No Known Problems Brother    • Stroke Neg Hx    • Heart Attack Neg Hx    • Cancer Neg Hx        ROS:     - NOTE: All other systems reviewed and are negative, except as in HPI.     Objective:   Physical Exam:  "   Vitals: /62 (BP Location: Left arm, Patient Position: Sitting, BP Cuff Size: Adult)   Pulse (!) 55   Temp 36.6 °C (97.9 °F) (Temporal)   Resp 14   Ht 1.676 m (5' 6\")   Wt 71.2 kg (156 lb 15.5 oz)   SpO2 98%    BMI: Body mass index is 25.34 kg/m².   General/Constitutional: Vitals as above, Well nourished, well developed male in no acute distress   Head/Eyes: Head is grossly normal & atraumatic, bilateral conjunctivae clear and not injected, bilateral EOMI, bilateral PERRL   ENT: Bilateral external ears grossly normal in appearance, Hearing grossly intact, External nares normal in appearance and without discharge/bleeding, bilateral tympanic membranes with appropriate cone of light reflex   Respiratory: No respiratory distress, bilateral lungs are clear to ausculation in all lung fields (anterior/lateral/posterior), no wheezing/rhonchi/rales   Cardiovascular: Regular rate and rhythm without murmur/gallops/rubs, distal pulses are intact and equal bilaterally (radial, posterior tibial), no bilateral lower extremity edema   MSK: No paraspinal muscular tenderness to palpation of entire back, No pain on gentle percussion of spinous processes of entire back, Gait grossly normal & not antalgic   Integumentary: No apparent rashes   Psych: Judgment grossly appropriate, no apparent depression/anxiety    Health Maintenance:     - Reviewed and found to be up-to-date.    Imaging/Labs:     - 07/03/19 -- acute kidney damage, and elevated (LDL) cholesterol that is mildly worsened    - 07/23/18 -- Mildly elevated LDL (bad) cholesterol, no prediabetes/diabetes, normal liver/kidney function    Assessment and Plan:   1. Annual physical exam  Patient in relatively good health apart from cholesterol elevation.    2. Acute kidney injury (HCC)  New problem, uncontrolled, reevaluate with BMP in the future.   - Basic Metabolic Panel; Future    3. Elevated LDL cholesterol level  Chronic, uncontrolled, patient advised to pursue " lifestyle changes, particularly cardiovascular exercise and increasing proportion of plant-based nutrition. Patient and I discussed the importance of lifestyle changes, with particular emphasis on plant-based nutrition (for the purposes of weight loss, general health, LDL elevation), as well as cardiovascular exercise, proper sleep, and stress management.  This discussion is briefly summarized in the patient instruction section below.  Patient verbalized understanding.   - Lipid Profile; Future    4. Seborrheic keratoses  Chronic, stable, well-controlled.      5. Seasonal allergic rhinitis due to pollen  Chronic, stable, well-controlled.  Continue taking medication as directed.     6. Epigastric pain  Resolved    7. Transaminitis  Resolved      RTC: In 6 months for Labs follow-up, and I can order new labs for cholesterol if needed.    PLEASE NOTE: This dictation was created using voice recognition software. I have made every reasonable attempt to correct obvious errors, but I expect that there are errors of grammar and possibly content that I did not discover before finalizing the note.

## 2019-07-08 NOTE — ASSESSMENT & PLAN NOTE
New problem, uncontrolled, determined by recent labs.  This is likely secondary to dehydration.  Therefore, patient has been asked to work on this, can get labs in 1 month or more.

## 2019-07-08 NOTE — ASSESSMENT & PLAN NOTE
Patient and I discussed recent labs (see below; LDL elevation) and that ASCVD risk is increased based on most recent lipid panel, current blood pressure (non-hypertensive), diabetes status (non-diabetic), and smoking status (non-smoker).    Patient and I then discussed necessary dietary changes to make to address dyslipidemia.  Patient is NOT currently taking cholesterol lowering medication.  Patient verbalized understanding.    ROS is NEGATIVE for dizziness, generalized weakness/fatigue, vision/hearing changes, jaw pain/paresthesias, BUE pain/paresthesias/numbness/weakness, chest pain/pressure, palpitations, dyspnea, RUQ abdominal pain, oliguria/anuria, BLE edema.    Lab Results   Component Value Date/Time    CHOLSTRLTOT 197 07/03/2019 06:05 AM     (H) 07/03/2019 06:05 AM    HDL 44 07/03/2019 06:05 AM    TRIGLYCERIDE 140 07/03/2019 06:05 AM

## 2019-08-12 ENCOUNTER — OFFICE VISIT (OUTPATIENT)
Dept: MEDICAL GROUP | Facility: MEDICAL CENTER | Age: 44
End: 2019-08-12
Payer: COMMERCIAL

## 2019-08-12 ENCOUNTER — HOSPITAL ENCOUNTER (OUTPATIENT)
Dept: LAB | Facility: MEDICAL CENTER | Age: 44
End: 2019-08-12
Attending: FAMILY MEDICINE
Payer: COMMERCIAL

## 2019-08-12 VITALS
HEIGHT: 66 IN | BODY MASS INDEX: 24.3 KG/M2 | HEART RATE: 59 BPM | TEMPERATURE: 98.9 F | OXYGEN SATURATION: 98 % | WEIGHT: 151.2 LBS | DIASTOLIC BLOOD PRESSURE: 62 MMHG | SYSTOLIC BLOOD PRESSURE: 108 MMHG

## 2019-08-12 DIAGNOSIS — R19.7 DIARRHEA OF PRESUMED INFECTIOUS ORIGIN: ICD-10-CM

## 2019-08-12 LAB
BASOPHILS # BLD AUTO: 1.3 % (ref 0–1.8)
BASOPHILS # BLD: 0.08 K/UL (ref 0–0.12)
EOSINOPHIL # BLD AUTO: 0.44 K/UL (ref 0–0.51)
EOSINOPHIL NFR BLD: 6.9 % (ref 0–6.9)
ERYTHROCYTE [DISTWIDTH] IN BLOOD BY AUTOMATED COUNT: 39.6 FL (ref 35.9–50)
HCT VFR BLD AUTO: 44.5 % (ref 42–52)
HGB BLD-MCNC: 15.4 G/DL (ref 14–18)
IMM GRANULOCYTES # BLD AUTO: 0.01 K/UL (ref 0–0.11)
IMM GRANULOCYTES NFR BLD AUTO: 0.2 % (ref 0–0.9)
LYMPHOCYTES # BLD AUTO: 1.64 K/UL (ref 1–4.8)
LYMPHOCYTES NFR BLD: 25.8 % (ref 22–41)
MCH RBC QN AUTO: 30.8 PG (ref 27–33)
MCHC RBC AUTO-ENTMCNC: 34.6 G/DL (ref 33.7–35.3)
MCV RBC AUTO: 89 FL (ref 81.4–97.8)
MONOCYTES # BLD AUTO: 0.53 K/UL (ref 0–0.85)
MONOCYTES NFR BLD AUTO: 8.3 % (ref 0–13.4)
NEUTROPHILS # BLD AUTO: 3.65 K/UL (ref 1.82–7.42)
NEUTROPHILS NFR BLD: 57.5 % (ref 44–72)
NRBC # BLD AUTO: 0 K/UL
NRBC BLD-RTO: 0 /100 WBC
PLATELET # BLD AUTO: 178 K/UL (ref 164–446)
PMV BLD AUTO: 10.9 FL (ref 9–12.9)
RBC # BLD AUTO: 5 M/UL (ref 4.7–6.1)
WBC # BLD AUTO: 6.4 K/UL (ref 4.8–10.8)

## 2019-08-12 PROCEDURE — 85025 COMPLETE CBC W/AUTO DIFF WBC: CPT

## 2019-08-12 PROCEDURE — 99214 OFFICE O/P EST MOD 30 MIN: CPT | Performed by: FAMILY MEDICINE

## 2019-08-12 PROCEDURE — 36415 COLL VENOUS BLD VENIPUNCTURE: CPT

## 2019-08-12 NOTE — PROGRESS NOTES
"Subjective:   Chief Complaint/History of Present Illness:  Nicolas Green is a 44 y.o. male established patient who presents today to discuss medical problems as listed below    The encounter diagnosis was Diarrhea of presumed infectious origin.    Frequent loose stools  New problem, uncontrolled, patient has been having gastrointestinal symptoms over the last 2 weeks.  Patient at first believe that this would self resolve, however as this has not gotten better, patient decided to present to clinic for further evaluation and management.  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  He describes his stools as being softer than normal but not watery, no hematochezia, no melena, no increase of frequency of defecation, however that the stools are floating in the toilet, and smell foul.    Of note, patient's wife has similar symptoms that started 1 to 2 days after his symptoms did, with the changes that she does not have stools that float, and her stools may have flecks of blood in them.    Therefore, we did discuss that the differential diagnosis can include diarrhea of presumed infectious origin, acute diverticulitis, viral gastroenteritis or gastroenteritis, malabsorption syndrome secondary to food intolerance on (however patient has not varied his diet over the last 2 to 4 weeks).  Patient also denies any possible exposure to dirty/contaminated water apart from above.      Patient Active Problem List    Diagnosis Date Noted   • Frequent loose stools 08/12/2019   • (HCC) Acute kidney injury 07/08/2019   • Family history of brain aneurysm 07/09/2018   • Elevated LDL cholesterol level 08/28/2017   • Seasonal allergic rhinitis due to pollen 08/14/2017   • Seborrheic keratoses 08/14/2017 " "      Additional History:   Allergies:    Patient has no known allergies.     Current Medications:     Current Outpatient Medications   Medication Sig Dispense Refill   • loratadine (CLARITIN) 10 MG Tab Take 10 mg by mouth every day.     • Ascorbic Acid (VITAMIN C PO) Take  by mouth.     • Multiple Vitamins-Minerals (CENTRUM ADULTS PO) Take  by mouth.       No current facility-administered medications for this visit.         Social History:     Social History     Tobacco Use   • Smoking status: Never Smoker   • Smokeless tobacco: Never Used   Substance Use Topics   • Alcohol use: No   • Drug use: No       ROS:     - NOTE: All other systems reviewed and are negative, except as in HPI.     Objective:   Physical Exam:    Vitals: /62 (BP Location: Left arm, Patient Position: Sitting, BP Cuff Size: Adult)   Pulse (!) 59   Temp 37.2 °C (98.9 °F) (Temporal)   Ht 1.665 m (5' 5.55\")   Wt 68.6 kg (151 lb 3.2 oz)   SpO2 98%    BMI: Body mass index is 24.74 kg/m².   General/Constitutional: Vitals as above, Well nourished, well developed male in no acute distress   Head/Eyes: Head is grossly normal & atraumatic, bilateral conjunctivae clear and not injected, bilateral EOMI, bilateral PERRL   ENT: Bilateral external ears grossly normal in appearance, Hearing grossly intact, External nares normal in appearance and without discharge/bleeding   Respiratory: No respiratory distress, bilateral lungs are clear to ausculation in all lung fields (anterior/lateral/posterior), no wheezing/rhonchi/rales   Cardiovascular: Regular rate and rhythm without murmur/gallops/rubs, distal pulses are intact and equal bilaterally (radial, posterior tibial), no bilateral lower extremity edema   Gastrointestinal: Abdomen resonant to percussion, Bowel sounds present in all 4 quadrants, abdomen mildly tender diffusely to deep palpation, ventral/umbilical hernias absent   MSK: Gait grossly normal & not antalgic   Integumentary: No apparent " rashyani   Psych: Judgment grossly appropriate, no apparent depression/anxiety    Health Maintenance:     - Not reviewed at this visit    Imaging/Labs:     - Not reviewed at this visit    Assessment and Plan:   1. Diarrhea of presumed infectious origin  New problem, uncontrolled, labs as below to evaluate.  I will respond with appropriate treatment of care based on results of these tests.   - CBC WITH DIFFERENTIAL; Future   - STOOL CULT+O+P+WBC        RTC: Worsening of symptoms..    PLEASE NOTE: This dictation was created using voice recognition software. I have made every reasonable attempt to correct obvious errors, but I expect that there are errors of grammar and possibly content that I did not discover before finalizing the note.

## 2019-08-12 NOTE — ASSESSMENT & PLAN NOTE
"New problem, uncontrolled, patient has been having gastrointestinal symptoms over the last 2 weeks.  Patient at first believe that this would self resolve, however as this has not gotten better, patient decided to present to clinic for further evaluation and management.  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  He describes his stools as being softer than normal but not watery, no hematochezia, no melena, no increase of frequency of defecation, however that the stools are floating in the toilet, and smell foul.    Of note, patient's wife has similar symptoms that started 1 to 2 days after his symptoms did, with the changes that she does not have stools that float, and her stools may have flecks of blood in them.    Therefore, we did discuss that the differential diagnosis can include diarrhea of presumed infectious origin, acute diverticulitis, viral gastroenteritis or gastroenteritis, malabsorption syndrome secondary to food intolerance on (however patient has not varied his diet over the last 2 to 4 weeks).  Patient also denies any possible exposure to dirty/contaminated water apart from above.  "

## 2019-08-13 ENCOUNTER — HOSPITAL ENCOUNTER (OUTPATIENT)
Facility: MEDICAL CENTER | Age: 44
End: 2019-08-13
Attending: FAMILY MEDICINE
Payer: COMMERCIAL

## 2019-08-13 LAB
G LAMBLIA+C PARVUM AG STL QL RAPID: NORMAL
SIGNIFICANT IND 70042: NORMAL
SITE SITE: NORMAL
SOURCE SOURCE: NORMAL
WBC STL QL MICRO: NORMAL

## 2019-08-13 PROCEDURE — 87329 GIARDIA AG IA: CPT

## 2019-08-13 PROCEDURE — 89055 LEUKOCYTE ASSESSMENT FECAL: CPT

## 2019-08-13 PROCEDURE — 87046 STOOL CULTR AEROBIC BACT EA: CPT

## 2019-08-13 PROCEDURE — 87899 AGENT NOS ASSAY W/OPTIC: CPT

## 2019-08-13 PROCEDURE — 87328 CRYPTOSPORIDIUM AG IA: CPT

## 2019-08-13 PROCEDURE — 87045 FECES CULTURE AEROBIC BACT: CPT

## 2019-08-14 LAB
E COLI SXT1+2 STL IA: NORMAL
SIGNIFICANT IND 70042: NORMAL
SITE SITE: NORMAL
SOURCE SOURCE: NORMAL

## 2019-08-16 LAB
BACTERIA STL CULT: NORMAL
E COLI SXT1+2 STL IA: NORMAL
SIGNIFICANT IND 70042: NORMAL
SITE SITE: NORMAL
SOURCE SOURCE: NORMAL

## 2020-01-08 ENCOUNTER — OFFICE VISIT (OUTPATIENT)
Dept: MEDICAL GROUP | Facility: PHYSICIAN GROUP | Age: 45
End: 2020-01-08
Payer: COMMERCIAL

## 2020-01-08 VITALS
SYSTOLIC BLOOD PRESSURE: 110 MMHG | DIASTOLIC BLOOD PRESSURE: 80 MMHG | TEMPERATURE: 98.5 F | HEIGHT: 66 IN | HEART RATE: 71 BPM | BODY MASS INDEX: 26.36 KG/M2 | WEIGHT: 164 LBS | OXYGEN SATURATION: 97 %

## 2020-01-08 DIAGNOSIS — Z00.00 WELL ADULT EXAM: ICD-10-CM

## 2020-01-08 DIAGNOSIS — E78.00 ELEVATED LDL CHOLESTEROL LEVEL: ICD-10-CM

## 2020-01-08 PROBLEM — R19.7 FREQUENT LOOSE STOOLS: Status: RESOLVED | Noted: 2019-08-12 | Resolved: 2020-01-08

## 2020-01-08 PROBLEM — N17.9 ACUTE KIDNEY INJURY (HCC): Status: RESOLVED | Noted: 2019-07-08 | Resolved: 2020-01-08

## 2020-01-08 PROCEDURE — 99396 PREV VISIT EST AGE 40-64: CPT | Performed by: FAMILY MEDICINE

## 2020-01-08 ASSESSMENT — ENCOUNTER SYMPTOMS
HEADACHES: 0
PALPITATIONS: 0
COUGH: 0
GASTROINTESTINAL NEGATIVE: 1
CHILLS: 0
CARDIOVASCULAR NEGATIVE: 1
DOUBLE VISION: 0
HEMOPTYSIS: 0
DIZZINESS: 0
TINGLING: 0
PSYCHIATRIC NEGATIVE: 1
NAUSEA: 0
MUSCULOSKELETAL NEGATIVE: 1
RESPIRATORY NEGATIVE: 1
FEVER: 0
EYES NEGATIVE: 1
BLURRED VISION: 0
MYALGIAS: 0
BRUISES/BLEEDS EASILY: 0
DEPRESSION: 0
NEUROLOGICAL NEGATIVE: 1
CONSTITUTIONAL NEGATIVE: 1
HEARTBURN: 0

## 2020-01-08 ASSESSMENT — PATIENT HEALTH QUESTIONNAIRE - PHQ9: CLINICAL INTERPRETATION OF PHQ2 SCORE: 0

## 2020-01-08 NOTE — PROGRESS NOTES
Subjective:      Nicolas Green is a 44 y.o. male who presents with Establish Care (Pt sts he is generally healthy. Not on any medications. )            Yearly exam, works as a  at [employer]  ldl of [employer] last summer but working on diet  Will recheck in the summer     1. Well adult exam    - Comp Metabolic Panel; Future  - Lipid Profile; Future  - CBC WITHOUT DIFFERENTIAL; Future  - VITAMIN D,25 HYDROXY; Future    2. Elevated LDL cholesterol level      Past Medical History:  No date: Hyperlipidemia  History reviewed. No pertinent surgical history.  Social History    Tobacco Use      Smoking status: Never Smoker      Smokeless tobacco: Never Used    Alcohol use: No    Drug use: No    Review of patient's family history indicates:  Problem: Arthritis      Relation: Maternal Grandmother          Age of Onset: (Not Specified)          Comment: OA  Problem: Dementia      Relation: Maternal Grandmother          Age of Onset: (Not Specified)  Problem: Hyperlipidemia      Relation: Maternal Grandmother          Age of Onset: (Not Specified)  Problem: Other      Relation: Mother          Age of Onset: (Not Specified)          Comment: Fibromyalgia  Problem: Arthritis      Relation: Mother          Age of Onset: (Not Specified)          Comment: OA  Problem: Hyperlipidemia      Relation: Father          Age of Onset: (Not Specified)  Problem: No Known Problems      Relation: Brother          Age of Onset: (Not Specified)  Problem: No Known Problems      Relation: Brother          Age of Onset: (Not Specified)  Problem: Stroke      Relation: Neg Hx          Age of Onset: (Not Specified)  Problem: Heart Attack      Relation: Neg Hx          Age of Onset: (Not Specified)  Problem: Cancer      Relation: Neg Hx          Age of Onset: (Not Specified)      Current Outpatient Medications: •  loratadine (CLARITIN) 10 MG Tab, Take 10 mg by mouth every day., Disp: , Rfl:  •  Ascorbic Acid (VITAMIN C PO), Take  by  "mouth., Disp: , Rfl: •  Multiple Vitamins-Minerals (CENTRUM ADULTS PO), Take  by mouth., Disp: , Rfl:      Patient was instructed on the use of medications, either prescriptions or OTC and informed on when the appropriate follow up time period should be. In addition, patient was also instructed that should any acute worsening occur that they should notify this clinic asap or call 911.        Review of Systems   Constitutional: Negative.  Negative for chills and fever.   HENT: Negative.  Negative for hearing loss.    Eyes: Negative.  Negative for blurred vision and double vision.   Respiratory: Negative.  Negative for cough and hemoptysis.    Cardiovascular: Negative.  Negative for chest pain and palpitations.   Gastrointestinal: Negative.  Negative for heartburn and nausea.   Genitourinary: Negative.  Negative for dysuria.   Musculoskeletal: Negative.  Negative for myalgias.   Skin: Negative.  Negative for rash.   Neurological: Negative.  Negative for dizziness, tingling and headaches.   Endo/Heme/Allergies: Negative.  Does not bruise/bleed easily.   Psychiatric/Behavioral: Negative.  Negative for depression and suicidal ideas.   All other systems reviewed and are negative.         Objective:     /80 (BP Location: Left arm, Patient Position: Sitting, BP Cuff Size: Adult)   Pulse 71   Temp 36.9 °C (98.5 °F) (Temporal)   Ht 1.676 m (5' 6\")   Wt 74.4 kg (164 lb)   SpO2 97%   BMI 26.47 kg/m²      Physical Exam  Vitals signs and nursing note reviewed.   Constitutional:       General: He is not in acute distress.     Appearance: He is well-developed. He is not diaphoretic.   HENT:      Head: Normocephalic and atraumatic.      Mouth/Throat:      Pharynx: No oropharyngeal exudate.   Eyes:      Pupils: Pupils are equal, round, and reactive to light.   Cardiovascular:      Rate and Rhythm: Normal rate and regular rhythm.      Heart sounds: Normal heart sounds. No murmur. No friction rub. No gallop.    Pulmonary: "      Effort: Pulmonary effort is normal. No respiratory distress.      Breath sounds: Normal breath sounds. No wheezing or rales.   Chest:      Chest wall: No tenderness.   Neurological:      Mental Status: He is alert and oriented to person, place, and time.   Psychiatric:         Behavior: Behavior normal.         Thought Content: Thought content normal.         Judgment: Judgment normal.                 Assessment/Plan:       1. Well adult exam    - Comp Metabolic Panel; Future  - Lipid Profile; Future  - CBC WITHOUT DIFFERENTIAL; Future  - VITAMIN D,25 HYDROXY; Future    2. Elevated LDL cholesterol level

## 2020-06-10 ENCOUNTER — HOSPITAL ENCOUNTER (OUTPATIENT)
Dept: LAB | Facility: MEDICAL CENTER | Age: 45
End: 2020-06-10
Attending: FAMILY MEDICINE
Payer: COMMERCIAL

## 2020-06-10 DIAGNOSIS — Z00.00 WELL ADULT EXAM: ICD-10-CM

## 2020-06-10 LAB
25(OH)D3 SERPL-MCNC: 57 NG/ML (ref 30–100)
ALBUMIN SERPL BCP-MCNC: 4.4 G/DL (ref 3.2–4.9)
ALBUMIN/GLOB SERPL: 2.1 G/DL
ALP SERPL-CCNC: 103 U/L (ref 30–99)
ALT SERPL-CCNC: 42 U/L (ref 2–50)
ANION GAP SERPL CALC-SCNC: 10 MMOL/L (ref 7–16)
AST SERPL-CCNC: 32 U/L (ref 12–45)
BILIRUB SERPL-MCNC: 1 MG/DL (ref 0.1–1.5)
BUN SERPL-MCNC: 24 MG/DL (ref 8–22)
CALCIUM SERPL-MCNC: 9.9 MG/DL (ref 8.5–10.5)
CHLORIDE SERPL-SCNC: 104 MMOL/L (ref 96–112)
CHOLEST SERPL-MCNC: 214 MG/DL (ref 100–199)
CO2 SERPL-SCNC: 25 MMOL/L (ref 20–33)
CREAT SERPL-MCNC: 1.1 MG/DL (ref 0.5–1.4)
ERYTHROCYTE [DISTWIDTH] IN BLOOD BY AUTOMATED COUNT: 37.5 FL (ref 35.9–50)
FASTING STATUS PATIENT QL REPORTED: NORMAL
GLOBULIN SER CALC-MCNC: 2.1 G/DL (ref 1.9–3.5)
GLUCOSE SERPL-MCNC: 99 MG/DL (ref 65–99)
HCT VFR BLD AUTO: 47.4 % (ref 42–52)
HDLC SERPL-MCNC: 51 MG/DL
HGB BLD-MCNC: 16.5 G/DL (ref 14–18)
LDLC SERPL CALC-MCNC: 139 MG/DL
MCH RBC QN AUTO: 30.6 PG (ref 27–33)
MCHC RBC AUTO-ENTMCNC: 34.8 G/DL (ref 33.7–35.3)
MCV RBC AUTO: 87.9 FL (ref 81.4–97.8)
PLATELET # BLD AUTO: 161 K/UL (ref 164–446)
PMV BLD AUTO: 10.5 FL (ref 9–12.9)
POTASSIUM SERPL-SCNC: 4 MMOL/L (ref 3.6–5.5)
PROT SERPL-MCNC: 6.5 G/DL (ref 6–8.2)
RBC # BLD AUTO: 5.39 M/UL (ref 4.7–6.1)
SODIUM SERPL-SCNC: 139 MMOL/L (ref 135–145)
TRIGL SERPL-MCNC: 119 MG/DL (ref 0–149)
WBC # BLD AUTO: 5.8 K/UL (ref 4.8–10.8)

## 2020-06-10 PROCEDURE — 36415 COLL VENOUS BLD VENIPUNCTURE: CPT

## 2020-06-10 PROCEDURE — 80061 LIPID PANEL: CPT

## 2020-06-10 PROCEDURE — 80053 COMPREHEN METABOLIC PANEL: CPT

## 2020-06-10 PROCEDURE — 85027 COMPLETE CBC AUTOMATED: CPT

## 2020-06-10 PROCEDURE — 82306 VITAMIN D 25 HYDROXY: CPT

## 2020-09-17 ENCOUNTER — OFFICE VISIT (OUTPATIENT)
Dept: MEDICAL GROUP | Facility: LAB | Age: 45
End: 2020-09-17
Payer: COMMERCIAL

## 2020-09-17 ENCOUNTER — TELEPHONE (OUTPATIENT)
Dept: MEDICAL GROUP | Facility: LAB | Age: 45
End: 2020-09-17

## 2020-09-17 VITALS
SYSTOLIC BLOOD PRESSURE: 108 MMHG | RESPIRATION RATE: 12 BRPM | TEMPERATURE: 97.7 F | WEIGHT: 152 LBS | BODY MASS INDEX: 24.43 KG/M2 | HEART RATE: 62 BPM | OXYGEN SATURATION: 98 % | HEIGHT: 66 IN | DIASTOLIC BLOOD PRESSURE: 70 MMHG

## 2020-09-17 DIAGNOSIS — Z00.00 WELL ADULT EXAM: ICD-10-CM

## 2020-09-17 DIAGNOSIS — Z23 NEED FOR VACCINATION: ICD-10-CM

## 2020-09-17 DIAGNOSIS — E78.5 HYPERLIPIDEMIA, UNSPECIFIED HYPERLIPIDEMIA TYPE: ICD-10-CM

## 2020-09-17 PROCEDURE — 99213 OFFICE O/P EST LOW 20 MIN: CPT | Performed by: FAMILY MEDICINE

## 2020-09-17 ASSESSMENT — ENCOUNTER SYMPTOMS
CONSTIPATION: 0
PALPITATIONS: 0
WHEEZING: 0
DIZZINESS: 0
ABDOMINAL PAIN: 0
FEVER: 0
HEADACHES: 0
NAUSEA: 0
VOMITING: 0
CHILLS: 0
MYALGIAS: 0
SORE THROAT: 0
SHORTNESS OF BREATH: 0
FOCAL WEAKNESS: 0
COUGH: 0
DEPRESSION: 0
BLURRED VISION: 0
DIARRHEA: 0

## 2020-09-17 ASSESSMENT — FIBROSIS 4 INDEX: FIB4 SCORE: 1.32

## 2020-09-17 NOTE — PROGRESS NOTES
Nicolas Green is a 45 y.o. male here to establish care. No acute concerns, would like to discuss lab results.    HPI:      Cholesterol  Recent total cholesterol elevated at 214 with LDL at 139  ASCVD risk 1.3%    Health maintenance  Active at work as  but would like to exercise more often.  He is working on diet, trying to eliminate unhealthy snacks with healthier choices  Non-smoker, no alcohol  Vaccines up-to-date, getting flu shot next week     Current medicines (including changes today)  Current Outpatient Medications   Medication Sig Dispense Refill   • loratadine (CLARITIN) 10 MG Tab Take 10 mg by mouth every day.     • Ascorbic Acid (VITAMIN C PO) Take  by mouth.     • Multiple Vitamins-Minerals (CENTRUM ADULTS PO) Take  by mouth.       No current facility-administered medications for this visit.      He  has a past medical history of Hyperlipidemia. He also has no past medical history of Diabetes (HCC) or Hypertension.  He  has no past surgical history on file.  Social History     Tobacco Use   • Smoking status: Never Smoker   • Smokeless tobacco: Never Used   Substance Use Topics   • Alcohol use: No   • Drug use: No     Social History     Social History Narrative   • Not on file     Family History   Problem Relation Age of Onset   • Arthritis Maternal Grandmother         OA   • Dementia Maternal Grandmother    • Hyperlipidemia Maternal Grandmother    • Other Mother         Fibromyalgia   • Arthritis Mother         OA   • Hyperlipidemia Father    • No Known Problems Brother    • No Known Problems Brother    • Stroke Neg Hx    • Heart Attack Neg Hx    • Cancer Neg Hx      Family Status   Relation Name Status   • MGMo  Alive   • Mo  Alive   • Fa   at age Brain aneurysm           • Bro  Alive   • Bro  Alive   • MGFa     • PGMo     • PGFa     • Neg Hx  (Not Specified)       ROS  Review of Systems   Constitutional: Negative for chills and fever.   HENT:  "Negative for congestion and sore throat.    Eyes: Negative for blurred vision.   Respiratory: Negative for cough, shortness of breath and wheezing.    Cardiovascular: Negative for chest pain and palpitations.   Gastrointestinal: Negative for abdominal pain, constipation, diarrhea, nausea and vomiting.   Genitourinary: Negative for dysuria and urgency.   Musculoskeletal: Negative for joint pain and myalgias.   Skin: Negative for rash.   Neurological: Negative for dizziness, focal weakness and headaches.   Psychiatric/Behavioral: Negative for depression.   All other systems reviewed and are negative.        Objective:     Physical Exam:  /70 (BP Location: Right arm, Patient Position: Sitting, BP Cuff Size: Adult)   Pulse 62   Temp 36.5 °C (97.7 °F)   Resp 12   Ht 1.676 m (5' 6\")   Wt 68.9 kg (152 lb)   SpO2 98%  Body mass index is 24.53 kg/m².  Constitutional: Alert. Well appearing. No distress.  Skin: Warm, dry, good turgor, no visible rashes.  Eye: Equal, round and reactive to light, conjunctiva clear, lids normal.  ENMT: Moist mucous membranes. Normal dentition. Oropharynx clear.  Neck: Trachea midline, no masses, no thyromegaly.  Respiratory: Normal effort. Lungs are clear to auscultation bilaterally.  Cardiovascular: Regular rate and rhythm. Normal S1/S2. No murmurs, rubs or gallops.   Abdomen: Soft, non-tender, non-distended. No masses, no hepatosplenomegaly.  Neuro: Moves all four extremities. No facial droop.  Psych: Answers questions appropriately. Normal affect and mood.    Assessment and Plan:     1. Hyperlipidemia, unspecified hyperlipidemia type  Recent LDL and total cholesterol elevated.  ASCVD score 1.3%.  Discussed diet and lifestyle modifications.    2. Well adult exam  Health maintenance reviewed and updated.  He is getting flu shot next week.  Age-appropriate history guidance provided.    Follow up: Return in about 1 year (around 9/17/2021).         PLEASE NOTE: This note was created " using voice recognition software.

## 2020-09-17 NOTE — TELEPHONE ENCOUNTER
Patient is on the [facility] Schedule 09/22/2020 for Flu vaccine/injection.    SPECIFIC Action To Be Taken: Orders pending, please sign.

## 2020-09-22 ENCOUNTER — NON-PROVIDER VISIT (OUTPATIENT)
Dept: MEDICAL GROUP | Facility: LAB | Age: 45
End: 2020-09-22
Payer: COMMERCIAL

## 2020-09-22 DIAGNOSIS — Z23 NEED FOR VACCINATION: ICD-10-CM

## 2020-09-22 PROCEDURE — 90686 IIV4 VACC NO PRSV 0.5 ML IM: CPT | Performed by: FAMILY MEDICINE

## 2020-09-22 PROCEDURE — 90471 IMMUNIZATION ADMIN: CPT | Performed by: FAMILY MEDICINE

## 2021-01-01 NOTE — TELEPHONE ENCOUNTER
"Congratulations on your new baby.  NEW PATIENT VISIT PRE-VISIT PLANNING    1.  E[employer] Patient is checked in Patient Demographics? YES    2.  Immunizations were updated in [facility] using WebIZ?: Yes       •  Web Iz Recommendations: FLU, HEPATITIS A , MMR , TD and VARICELLA (Chicken Pox)     3.  Is this appointment scheduled as a Hospital Follow-Up? No    4.  Patient is due for the following Health Maintenance Topics:   There are no preventive care reminders to display for this patient.      5.  Reviewed/Updated the following with patient:       •   Preferred Pharmacy? YES       •   Preferred Lab? YES       •   Medications? YES. Was Abstract Encounter opened and chart updated? YES       •   Social History? YES. Was Abstract Encounter opened and chart updated? YES       •   Family History? YES. Was Abstract Encounter opened and chart updated? YES    6.  Updated Care Team?       •   DME Company (gait device, O2, CPAP, etc.) YES       •   Other Specialists (eye doctor, derm, GYN, cardiology, endo, etc): YES    7.  Patient was informed to arrive 15 min prior to their scheduled appointment and bring in their medication bottles.     

## 2021-08-02 ENCOUNTER — TELEPHONE (OUTPATIENT)
Dept: MEDICAL GROUP | Facility: LAB | Age: 46
End: 2021-08-02

## 2021-08-02 ENCOUNTER — PATIENT MESSAGE (OUTPATIENT)
Dept: MEDICAL GROUP | Facility: LAB | Age: 46
End: 2021-08-02

## 2021-08-02 ENCOUNTER — HOSPITAL ENCOUNTER (OUTPATIENT)
Facility: MEDICAL CENTER | Age: 46
End: 2021-08-02
Attending: FAMILY MEDICINE
Payer: COMMERCIAL

## 2021-08-02 ENCOUNTER — OFFICE VISIT (OUTPATIENT)
Dept: MEDICAL GROUP | Facility: LAB | Age: 46
End: 2021-08-02
Payer: COMMERCIAL

## 2021-08-02 VITALS
SYSTOLIC BLOOD PRESSURE: 110 MMHG | TEMPERATURE: 98.2 F | RESPIRATION RATE: 12 BRPM | HEIGHT: 66 IN | WEIGHT: 149 LBS | DIASTOLIC BLOOD PRESSURE: 70 MMHG | BODY MASS INDEX: 23.95 KG/M2 | OXYGEN SATURATION: 96 % | HEART RATE: 70 BPM

## 2021-08-02 DIAGNOSIS — J02.8 PHARYNGITIS DUE TO OTHER ORGANISM: ICD-10-CM

## 2021-08-02 LAB — COVID ORDER STATUS COVID19: NORMAL

## 2021-08-02 PROCEDURE — 99214 OFFICE O/P EST MOD 30 MIN: CPT | Performed by: FAMILY MEDICINE

## 2021-08-02 PROCEDURE — U0003 INFECTIOUS AGENT DETECTION BY NUCLEIC ACID (DNA OR RNA); SEVERE ACUTE RESPIRATORY SYNDROME CORONAVIRUS 2 (SARS-COV-2) (CORONAVIRUS DISEASE [COVID-19]), AMPLIFIED PROBE TECHNIQUE, MAKING USE OF HIGH THROUGHPUT TECHNOLOGIES AS DESCRIBED BY CMS-2020-01-R: HCPCS

## 2021-08-02 PROCEDURE — U0005 INFEC AGEN DETEC AMPLI PROBE: HCPCS

## 2021-08-02 RX ORDER — AMOXICILLIN AND CLAVULANATE POTASSIUM 875; 125 MG/1; MG/1
1 TABLET, FILM COATED ORAL 2 TIMES DAILY
Qty: 14 TABLET | Refills: 0 | Status: SHIPPED | OUTPATIENT
Start: 2021-08-02 | End: 2021-08-02

## 2021-08-02 RX ORDER — AZITHROMYCIN 250 MG/1
TABLET, FILM COATED ORAL
Qty: 6 TABLET | Refills: 0 | Status: SHIPPED | OUTPATIENT
Start: 2021-08-02 | End: 2022-07-27

## 2021-08-02 ASSESSMENT — PATIENT HEALTH QUESTIONNAIRE - PHQ9: CLINICAL INTERPRETATION OF PHQ2 SCORE: 0

## 2021-08-02 ASSESSMENT — FIBROSIS 4 INDEX: FIB4 SCORE: 1.35

## 2021-08-02 NOTE — LETTER
August 2, 2021        Nicolas Green is under my medical care and should be excused from work missed due to illness. May return on or after the 4th of august pending test results.             Cathy Mccrary M.D.

## 2021-08-02 NOTE — PROGRESS NOTES
"Subjective:     Chief Complaint   Patient presents with   • Pharyngitis     x 9 days   working in smoke area/          HPI:   Nicolas presents today with the above. 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 Started also with uRI, headaches, congestion. Seems like its settling in the chest area. Coughing up green phlegm as well. No fevers. No SOB.   Has tried some sudafed, quercetin, MVI.   Lots of fluids. Lozenges, zinc.   No ear pressure or pain.       Current Outpatient Medications Ordered in Epic   Medication Sig Dispense Refill   • loratadine (CLARITIN) 10 MG Tab Take 10 mg by mouth every day.     • Ascorbic Acid (VITAMIN C PO) Take  by mouth.     • Multiple Vitamins-Minerals (CENTRUM ADULTS PO) Take  by mouth.       No current Epic-ordered facility-administered medications on file.           ROS:  Gen: no fevers/chills, no changes in weight  Eyes: no changes in vision  CV: no chest pain, no palpitations  GI: no nausea/vomiting, no diarrhea  : no dysuria  MSk: no myalgias  Skin: no rash          Objective:     Exam:  /70 (BP Location: Right arm, Patient Position: Sitting, BP Cuff Size: Adult)   Pulse 70   Temp 36.8 °C (98.2 °F)   Resp 12   Ht 1.676 m (5' 6\")   Wt 67.6 kg (149 lb)   SpO2 96%   BMI 24.05 kg/m²  Body mass index is 24.05 kg/m².    Gen: Alert and oriented, No apparent distress.  HEENT: Oropharynx is slightly erythematous.  No exudates present.  TMs are normal bilaterally, canals clear, nares are patent.  Neck: Neck is supple without lymphadenopathy.  Lungs: Normal effort, CTA bilaterally, no wheezes, rhonchi, or rales  CV: Regular rate and rhythm. No murmurs, rubs, or gallops.  Ext: No clubbing, cyanosis, edema.        Not COVID vaccinated.     Assessment & Plan:     46 y.o. male with the following -   1. Pharyngitis due to other organism  Discussed possible sinus infection and/or bronchitis.  We will start on some Augmentin given the length of duration this is " been going on.  We will also get Covid testing.  Should Covid be positive we will tell him to stop his antibiotics and quarantine appropriately.  We did discuss being off work for the next 2 days until we get the Covid test back as well.  Okay to continue using over-the-counter conservative measures such as lozenges, cold medicine, cough medicine.        No follow-ups on file.    Please note that this dictation was created using voice recognition software. I have made every reasonable attempt to correct obvious errors, but I expect that there are errors of grammar and possibly content that I did not discover before finalizing the note.

## 2021-08-02 NOTE — PATIENT INSTRUCTIONS
COVID-19 results pending    1. Return home and continue self-isolation until you get your test result back.  If positive: You will be called by [facility] staff and will be getting a Memrise message    Stay home and continue self isolation until:  · At least ten (10) days have passed since symptoms first appeared AND  · At least 24 hours have passed from last fever without the use of fever-reducing medications AND  · Symptoms have improved (eg: cough or shortness of breath)    If negative: You will be getting a Memrise message or a letter from [facility].  · Stay home until you have no fever for 24 hours and your symptoms (cough and shortness of breath) have resolved.    You may call [facility] ER Discharge Culture Line at [phone] for results/questions.    2. Even after the above are met, maintain social distance from others (at least 6 feet) and practice frequent hand washing.    3. Wear a face mask in public places.

## 2021-08-03 LAB
SARS-COV-2 RNA RESP QL NAA+PROBE: NOTDETECTED
SPECIMEN SOURCE: NORMAL

## 2021-09-02 ENCOUNTER — HOSPITAL ENCOUNTER (OUTPATIENT)
Facility: MEDICAL CENTER | Age: 46
End: 2021-09-02
Attending: FAMILY MEDICINE
Payer: COMMERCIAL

## 2021-09-02 ENCOUNTER — NON-PROVIDER VISIT (OUTPATIENT)
Dept: MEDICAL GROUP | Facility: LAB | Age: 46
End: 2021-09-02
Payer: COMMERCIAL

## 2021-09-02 DIAGNOSIS — Z11.59 SCREENING FOR VIRAL DISEASE: ICD-10-CM

## 2021-09-02 DIAGNOSIS — Z20.828 EXPOSURE TO SARS-ASSOCIATED CORONAVIRUS: ICD-10-CM

## 2021-09-02 PROCEDURE — U0005 INFEC AGEN DETEC AMPLI PROBE: HCPCS

## 2021-09-02 PROCEDURE — U0003 INFECTIOUS AGENT DETECTION BY NUCLEIC ACID (DNA OR RNA); SEVERE ACUTE RESPIRATORY SYNDROME CORONAVIRUS 2 (SARS-COV-2) (CORONAVIRUS DISEASE [COVID-19]), AMPLIFIED PROBE TECHNIQUE, MAKING USE OF HIGH THROUGHPUT TECHNOLOGIES AS DESCRIBED BY CMS-2020-01-R: HCPCS

## 2021-09-02 NOTE — PROGRESS NOTES
Nicolas Green is a 46 y.o. male here for a non-provider visit for COVID testing.    Clinic collect COVID order in system?: Yes    Patient tolerated specimen collection and no adverse effects were observed or reported: Yes

## 2021-09-03 DIAGNOSIS — Z11.59 SCREENING FOR VIRAL DISEASE: ICD-10-CM

## 2021-09-03 DIAGNOSIS — Z20.828 EXPOSURE TO SARS-ASSOCIATED CORONAVIRUS: ICD-10-CM

## 2021-09-03 LAB
COVID ORDER STATUS COVID19: NORMAL
SARS-COV-2 RNA RESP QL NAA+PROBE: DETECTED
SPECIMEN SOURCE: ABNORMAL

## 2021-10-27 ENCOUNTER — OFFICE VISIT (OUTPATIENT)
Dept: MEDICAL GROUP | Facility: LAB | Age: 46
End: 2021-10-27
Payer: COMMERCIAL

## 2021-10-27 VITALS
WEIGHT: 151.8 LBS | DIASTOLIC BLOOD PRESSURE: 72 MMHG | TEMPERATURE: 97.6 F | RESPIRATION RATE: 16 BRPM | BODY MASS INDEX: 24.4 KG/M2 | SYSTOLIC BLOOD PRESSURE: 116 MMHG | HEIGHT: 66 IN | OXYGEN SATURATION: 97 % | HEART RATE: 106 BPM

## 2021-10-27 DIAGNOSIS — G51.0 BELL'S PALSY: ICD-10-CM

## 2021-10-27 PROCEDURE — 99213 OFFICE O/P EST LOW 20 MIN: CPT | Performed by: NURSE PRACTITIONER

## 2021-10-27 RX ORDER — PREDNISONE 20 MG/1
60 TABLET ORAL DAILY
Qty: 21 TABLET | Refills: 0 | Status: SHIPPED | OUTPATIENT
Start: 2021-10-27 | End: 2021-11-03

## 2021-10-27 ASSESSMENT — FIBROSIS 4 INDEX: FIB4 SCORE: 1.35

## 2021-10-27 NOTE — PROGRESS NOTES
"Subjective:     CC: The encounter diagnosis was Bell's palsy.    HPI:   Nicolas presents today with the following:    [place]umb[place] palsy  Patient reports that he had covid 5-6 weeks ago. He reports a sore neck for the past 2 - 3 days. The last 4-5 days he has had some tongue numbness, and last night he noticed that he was unable to move his lips/cheek on that side. Denies rash, ear pain, symmetric muscle weakness, muscle weakness/tingling/numbness of upper or lower extremities, syncope, chest pain, heart palpitations.      Current Outpatient Medications Ordered in Epic   Medication Sig Dispense Refill   • predniSONE (DELTASONE) 20 MG Tab Take 3 Tablets by mouth every day for 7 days. 21 Tablet 0   • azithromycin (ZITHROMAX) 250 MG Tab Take 2 tabs on day one, then one tab daily for 4 days after. 6 tablet 0   • loratadine (CLARITIN) 10 MG Tab Take 10 mg by mouth every day.     • Ascorbic Acid (VITAMIN C PO) Take  by mouth.     • Multiple Vitamins-Minerals (CENTRUM ADULTS PO) Take  by mouth.       No current Epic-ordered facility-administered medications on file.     ROS:   Gen: no fevers/chills, no changes in weight  Eyes: no changes in vision  ENT: no sore throat, no hearing loss, no bloody nose  Pulm: no sob, no cough  CV: no chest pain, no palpitations  GI: no nausea/vomiting, no diarrhea  : no dysuria  MSk: no myalgias  Skin: no rash  Neuro: no headaches  Heme/Lymph: no easy bruising        - NOTE: All other systems reviewed and are negative, except as in HPI.    Objective:     Exam: /72 (BP Location: Right arm, Patient Position: Sitting, BP Cuff Size: Adult)   Pulse (!) 106   Temp 36.4 °C (97.6 °F)   Resp 16   Ht 1.676 m (5' 6\")   Wt 68.9 kg (151 lb 12.8 oz)   SpO2 97%  Body mass index is 24.5 kg/m².    General: Normal appearing. No distress.  HEENT: Normocephalic. Eyes conjunctiva clear lids without ptosis, pupils equal and reactive to light accommodation, ears normal shape and contour, canals are clear " bilaterally, tympanic membranes are benign, nasal mucosa benign, oropharynx is without erythema, edema or exudates.   Neck: Supple without JVD or bruit. Thyroid is not enlarged.  Pulmonary: Clear to ausculation.  Normal effort. No rales, ronchi, or wheezing.  Cardiovascular: Regular rate and rhythm without murmur. Carotid and radial pulses are intact and equal bilaterally.  Abdomen: Soft, nontender, nondistended. Normal bowel sounds. Liver and spleen are not palpable  Neurologic: Slight facial weakness noticeable on close inspection, normal symmetry and tone at rest.  Good function of forehead.  Complete eye closure with minimal effort.  Slight mouth asymmetry.  Lymph: No cervical or supraclavicular lymph nodes are palpable  Skin: Warm and dry.  No obvious lesions.  Musculoskeletal: Normal gait. No extremity cyanosis, clubbing, or edema.  Psych: Normal mood and affect. Alert and oriented x3. Judgment and insight is normal.    Assessment & Plan:     46 y.o. male with the following -     1. Bell's palsy  Patient to take medication as prescribed. Side effects of medication prescribed today were discussed with the patient including how to take the medication and proper dosage. Discussed repercussions of not taking the medication as prescribed. Instructed to call the office should he have any negative side effects or problems with the medication. Supportive care, differential diagnoses, and indications for immediate follow-up discussed with patient. Pathogenesis of diagnosis discussed including typical length and natural progression. Instructed to return to clinic or nearest emergency department for any change in condition, further concerns, or worsening of symptoms.  - predniSONE (DELTASONE) 20 MG Tab; Take 3 Tablets by mouth every day for 7 days.  Dispense: 21 Tablet; Refill: 0    Return in about 1 week (around 11/3/2021).    Please note that this dictation was created using voice recognition software. I have made  every reasonable attempt to correct obvious errors, but I expect that there are errors of grammar and possibly content that I did not discover before finalizing the note.

## 2022-01-19 ENCOUNTER — OFFICE VISIT (OUTPATIENT)
Dept: MEDICAL GROUP | Facility: LAB | Age: 47
End: 2022-01-19
Payer: COMMERCIAL

## 2022-01-19 VITALS
BODY MASS INDEX: 24.75 KG/M2 | OXYGEN SATURATION: 97 % | TEMPERATURE: 98.9 F | HEART RATE: 104 BPM | DIASTOLIC BLOOD PRESSURE: 76 MMHG | SYSTOLIC BLOOD PRESSURE: 118 MMHG | HEIGHT: 66 IN | RESPIRATION RATE: 14 BRPM | WEIGHT: 154 LBS

## 2022-01-19 DIAGNOSIS — J06.9 UPPER RESPIRATORY TRACT INFECTION, UNSPECIFIED TYPE: ICD-10-CM

## 2022-01-19 PROCEDURE — 99213 OFFICE O/P EST LOW 20 MIN: CPT | Performed by: NURSE PRACTITIONER

## 2022-01-19 ASSESSMENT — FIBROSIS 4 INDEX: FIB4 SCORE: 1.35

## 2022-01-19 NOTE — PROGRESS NOTES
"Subjective:     CC: There were no encounter diagnoses.    HPI:   Nicolas presents today with the following:    Upper respiratory infection  Onset yesterday. Current symptoms include congestion, mild cough, runny nose, fever (tmax 99.9), fatigue.   Denies sore throat, myalgias, chills, headache, shortness of breath, N/V/D, loss of taste/smell.  Patient has a covid test at home that he would prefer to use.   Patient has been taking OTC cold medication.  No known sick contacts.  Patient has not been vaccinated against covid, but did have covid in September and received monoclonal antibodies.     ROS:   Gen: no fevers/chills, no changes in weight  Eyes: no changes in vision  ENT: no sore throat, no hearing loss, no bloody nose  Pulm: no sob, no cough  CV: no chest pain, no palpitations  GI: no nausea/vomiting, no diarrhea  : no dysuria  MSk: no myalgias  Skin: no rash  Neuro: no headaches, no numbness/tingling  Heme/Lymph: no easy bruising        - NOTE: All other systems reviewed and are negative, except as in HPI.    Objective:     Exam: /76 (BP Location: Right arm, Patient Position: Sitting, BP Cuff Size: Adult)   Pulse (!) 104   Temp 37.2 °C (98.9 °F)   Resp 14   Ht 1.676 m (5' 6\")   Wt 69.9 kg (154 lb)   SpO2 97%  Body mass index is 24.86 kg/m².    Constitutional: Alert, no distress, plus 3 vital signs  Skin:  Warm, dry, no rashes invisible areas  Eye: Equal, round and reactive, conjunctiva clear  ENMT: Bilateral tympanic membranes are retracted but translucent without erythema or perforation. Positive bilateral maxillary sinus tenderness. Oropharynx is slightly injected without exudate. No tonsillar enlargement.    Neck: Mild anterior cervical, nontender lymphadenopathy  Respiratory: Unlabored respiration, lungs clear to auscultation, no wheezes, no rhonchi  Cardiovascular: Normal rate and rhythm  Psych: Alert, pleasant, well-groomed, normal affect    Assessment & Plan:     46 y.o. male with the " following -     1. Upper respiratory tract infection, unspecified type  Discussed using a cool mist humidifier at home. Recommend using nasal saline wash (i.e. Nedi-Pot), over-the-counter decongestants as needed. Tylenol or Motrin as needed for headache or discomfort. Supportive care, differential diagnoses, and indications for immediate follow-up discussed with patient. Pathogenesis of diagnosis discussed including typical length and natural progression. Instructed to return to clinic for any change in condition, further concerns, or worsening of symptoms.

## 2022-07-27 ENCOUNTER — APPOINTMENT (OUTPATIENT)
Dept: LAB | Facility: MEDICAL CENTER | Age: 47
End: 2022-07-27
Attending: FAMILY MEDICINE
Payer: COMMERCIAL

## 2022-07-27 ENCOUNTER — OFFICE VISIT (OUTPATIENT)
Dept: MEDICAL GROUP | Facility: LAB | Age: 47
End: 2022-07-27
Payer: COMMERCIAL

## 2022-07-27 VITALS
HEIGHT: 66 IN | TEMPERATURE: 96.9 F | SYSTOLIC BLOOD PRESSURE: 110 MMHG | RESPIRATION RATE: 14 BRPM | WEIGHT: 149.2 LBS | BODY MASS INDEX: 23.98 KG/M2 | HEART RATE: 56 BPM | DIASTOLIC BLOOD PRESSURE: 76 MMHG | OXYGEN SATURATION: 97 %

## 2022-07-27 DIAGNOSIS — Z00.00 WELL ADULT EXAM: ICD-10-CM

## 2022-07-27 PROCEDURE — 99396 PREV VISIT EST AGE 40-64: CPT | Performed by: FAMILY MEDICINE

## 2022-07-27 ASSESSMENT — PATIENT HEALTH QUESTIONNAIRE - PHQ9: CLINICAL INTERPRETATION OF PHQ2 SCORE: 0

## 2022-07-27 NOTE — PROGRESS NOTES
Subjective:     CC:   Chief Complaint   Patient presents with   • Annual Exam   • Requesting Labs       HPI:   Nicolas Green is a 47 y.o. male who presents for an annual exam. He is feeling well and has no complaints.    Colonoscopy - defers at this time, will consider next year  Last Tdap: 2015  Qualify for abdominal us screen: No  Qualify for hep c screen: No  Regular exercise: yes  Diet: working on improving    He  has a past medical history of Hyperlipidemia.    He has no past medical history of Diabetes (HCC) or Hypertension.  He  has no past surgical history on file.  Family History   Problem Relation Age of Onset   • Arthritis Maternal Grandmother         OA   • Dementia Maternal Grandmother    • Hyperlipidemia Maternal Grandmother    • Other Mother         Fibromyalgia   • Arthritis Mother         OA   • Hyperlipidemia Father    • No Known Problems Brother    • No Known Problems Brother    • Stroke Neg Hx    • Heart Attack Neg Hx    • Cancer Neg Hx      Social History     Tobacco Use   • Smoking status: Never Smoker   • Smokeless tobacco: Never Used   Vaping Use   • Vaping Use: Never used   Substance Use Topics   • Alcohol use: No   • Drug use: No       Patient Active Problem List    Diagnosis Date Noted   • Family history of brain aneurysm 07/09/2018   • Elevated LDL cholesterol level 08/28/2017   • Seasonal allergic rhinitis due to pollen 08/14/2017   • Seborrheic keratoses 08/14/2017       Current Outpatient Medications   Medication Sig Dispense Refill   • loratadine (CLARITIN) 10 MG Tab Take 10 mg by mouth every day.     • Ascorbic Acid (VITAMIN C PO) Take  by mouth.     • Multiple Vitamins-Minerals (CENTRUM ADULTS PO) Take  by mouth.     • azithromycin (ZITHROMAX) 250 MG Tab Take 2 tabs on day one, then one tab daily for 4 days after. 6 tablet 0     No current facility-administered medications for this visit.    (including changes today)  Allergies: Patient has no known allergies.    Review of  "Systems   Constitutional: Negative for fever, chills and malaise/fatigue.   HENT: Negative for congestion.    Eyes: Negative for pain.   Respiratory: Negative for cough and shortness of breath.    Cardiovascular: Negative for leg swelling.   Gastrointestinal: Negative for nausea, vomiting, abdominal pain and diarrhea.   Genitourinary: Negative for dysuria and hematuria.   Skin: Negative for rash.   Neurological: Negative for dizziness, focal weakness and headaches.   Endo/Heme/Allergies: Does not bruise/bleed easily.   Psychiatric/Behavioral: Negative for depression.  The patient is not nervous/anxious.      Objective:     /76 (BP Location: Right arm, Patient Position: Sitting, BP Cuff Size: Adult)   Pulse (!) 56   Temp 36.1 °C (96.9 °F)   Resp 14   Ht 1.676 m (5' 6\")   Wt 67.7 kg (149 lb 3.2 oz)   SpO2 97%   BMI 24.08 kg/m²   Body mass index is 24.08 kg/m².  Wt Readings from Last 4 Encounters:   07/27/22 67.7 kg (149 lb 3.2 oz)   01/19/22 69.9 kg (154 lb)   10/27/21 68.9 kg (151 lb 12.8 oz)   08/02/21 67.6 kg (149 lb)       Physical Exam:  Constitutional: Well-developed and well-nourished. Not diaphoretic. No distress.   Skin: Skin is warm and dry. No rash noted.  Head: Atraumatic without lesions.  Eyes: Conjunctivae and extraocular motions are normal. Pupils are equal, round, and reactive to light. No scleral icterus.   Ears:  External ears unremarkable.   Nose: Nares patent. Septum midline.   Neck: Supple, trachea midline. Normal range of motion. No thyromegaly present. No lymphadenopathy--cervical or supraclavicular.  Cardiovascular: Regular rate and rhythm, S1 and S2 without murmur, rubs, or gallops.    Chest: Effort normal. Clear to auscultation throughout.  Extremities: No cyanosis, clubbing, erythema, nor edema. Distal pulses intact and symmetric.   Musculoskeletal: All major joints AROM full in all directions without pain.  Neurological: Alert and oriented x 3. No cranial nerve deficit. " Sensation to extremities grossly intact to light touch.  Psychiatric:  Behavior, mood, and affect are appropriate.    Assessment and Plan:     1. Well adult exam  Health maintenance reviewed and updated.  Age-appropriate anticipatory guidance provided.  - CBC WITH DIFFERENTIAL; Future  - Comp Metabolic Panel; Future  - Lipid Profile; Future  - HEMOGLOBIN A1C; Future    Follow-up: Return in about 1 year (around 7/27/2023), or if symptoms worsen or fail to improve.    Please note that this note was created using voice recognition software.

## 2022-07-28 ENCOUNTER — HOSPITAL ENCOUNTER (OUTPATIENT)
Dept: LAB | Facility: MEDICAL CENTER | Age: 47
End: 2022-07-28
Attending: FAMILY MEDICINE
Payer: COMMERCIAL

## 2022-07-28 DIAGNOSIS — Z00.00 WELL ADULT EXAM: ICD-10-CM

## 2022-07-28 LAB
ALBUMIN SERPL BCP-MCNC: 5 G/DL (ref 3.2–4.9)
ALBUMIN/GLOB SERPL: 2.8 G/DL
ALP SERPL-CCNC: 100 U/L (ref 30–99)
ALT SERPL-CCNC: 39 U/L (ref 2–50)
ANION GAP SERPL CALC-SCNC: 11 MMOL/L (ref 7–16)
AST SERPL-CCNC: 34 U/L (ref 12–45)
BASOPHILS # BLD AUTO: 1 % (ref 0–1.8)
BASOPHILS # BLD: 0.06 K/UL (ref 0–0.12)
BILIRUB SERPL-MCNC: 0.8 MG/DL (ref 0.1–1.5)
BUN SERPL-MCNC: 21 MG/DL (ref 8–22)
CALCIUM SERPL-MCNC: 9.7 MG/DL (ref 8.5–10.5)
CHLORIDE SERPL-SCNC: 106 MMOL/L (ref 96–112)
CHOLEST SERPL-MCNC: 188 MG/DL (ref 100–199)
CO2 SERPL-SCNC: 25 MMOL/L (ref 20–33)
CREAT SERPL-MCNC: 1.2 MG/DL (ref 0.5–1.4)
EOSINOPHIL # BLD AUTO: 0.42 K/UL (ref 0–0.51)
EOSINOPHIL NFR BLD: 7 % (ref 0–6.9)
ERYTHROCYTE [DISTWIDTH] IN BLOOD BY AUTOMATED COUNT: 40.5 FL (ref 35.9–50)
EST. AVERAGE GLUCOSE BLD GHB EST-MCNC: 91 MG/DL
FASTING STATUS PATIENT QL REPORTED: NORMAL
GFR SERPLBLD CREATININE-BSD FMLA CKD-EPI: 76 ML/MIN/1.73 M 2
GLOBULIN SER CALC-MCNC: 1.8 G/DL (ref 1.9–3.5)
GLUCOSE SERPL-MCNC: 94 MG/DL (ref 65–99)
HBA1C MFR BLD: 4.8 % (ref 4–5.6)
HCT VFR BLD AUTO: 47 % (ref 42–52)
HDLC SERPL-MCNC: 47 MG/DL
HGB BLD-MCNC: 16.2 G/DL (ref 14–18)
IMM GRANULOCYTES # BLD AUTO: 0.01 K/UL (ref 0–0.11)
IMM GRANULOCYTES NFR BLD AUTO: 0.2 % (ref 0–0.9)
LDLC SERPL CALC-MCNC: 117 MG/DL
LYMPHOCYTES # BLD AUTO: 1.56 K/UL (ref 1–4.8)
LYMPHOCYTES NFR BLD: 26 % (ref 22–41)
MCH RBC QN AUTO: 30.7 PG (ref 27–33)
MCHC RBC AUTO-ENTMCNC: 34.5 G/DL (ref 33.7–35.3)
MCV RBC AUTO: 89.2 FL (ref 81.4–97.8)
MONOCYTES # BLD AUTO: 0.57 K/UL (ref 0–0.85)
MONOCYTES NFR BLD AUTO: 9.5 % (ref 0–13.4)
NEUTROPHILS # BLD AUTO: 3.38 K/UL (ref 1.82–7.42)
NEUTROPHILS NFR BLD: 56.3 % (ref 44–72)
NRBC # BLD AUTO: 0 K/UL
NRBC BLD-RTO: 0 /100 WBC
PLATELET # BLD AUTO: 174 K/UL (ref 164–446)
PMV BLD AUTO: 11 FL (ref 9–12.9)
POTASSIUM SERPL-SCNC: 4.3 MMOL/L (ref 3.6–5.5)
PROT SERPL-MCNC: 6.8 G/DL (ref 6–8.2)
RBC # BLD AUTO: 5.27 M/UL (ref 4.7–6.1)
SODIUM SERPL-SCNC: 142 MMOL/L (ref 135–145)
TRIGL SERPL-MCNC: 118 MG/DL (ref 0–149)
WBC # BLD AUTO: 6 K/UL (ref 4.8–10.8)

## 2022-07-28 PROCEDURE — 80061 LIPID PANEL: CPT

## 2022-07-28 PROCEDURE — 83036 HEMOGLOBIN GLYCOSYLATED A1C: CPT

## 2022-07-28 PROCEDURE — 36415 COLL VENOUS BLD VENIPUNCTURE: CPT

## 2022-07-28 PROCEDURE — 85025 COMPLETE CBC W/AUTO DIFF WBC: CPT

## 2022-07-28 PROCEDURE — 80053 COMPREHEN METABOLIC PANEL: CPT

## 2022-12-30 ENCOUNTER — OFFICE VISIT (OUTPATIENT)
Dept: MEDICAL GROUP | Facility: MEDICAL CENTER | Age: 47
End: 2022-12-30
Payer: COMMERCIAL

## 2022-12-30 VITALS
DIASTOLIC BLOOD PRESSURE: 80 MMHG | HEART RATE: 90 BPM | BODY MASS INDEX: 25.86 KG/M2 | HEIGHT: 66 IN | OXYGEN SATURATION: 97 % | RESPIRATION RATE: 16 BRPM | TEMPERATURE: 98.3 F | SYSTOLIC BLOOD PRESSURE: 114 MMHG | WEIGHT: 160.94 LBS

## 2022-12-30 DIAGNOSIS — R05.2 SUBACUTE COUGH: ICD-10-CM

## 2022-12-30 DIAGNOSIS — R09.82 PND (POST-NASAL DRIP): ICD-10-CM

## 2022-12-30 DIAGNOSIS — J02.9 SORE THROAT: ICD-10-CM

## 2022-12-30 DIAGNOSIS — R09.89 CHEST CONGESTION: ICD-10-CM

## 2022-12-30 DIAGNOSIS — R68.89 FLU-LIKE SYMPTOMS: ICD-10-CM

## 2022-12-30 LAB
EXTERNAL QUALITY CONTROL: NORMAL
FLUAV+FLUBV AG SPEC QL IA: NEGATIVE
INT CON NEG: NORMAL
INT CON POS: NORMAL
S PYO AG THROAT QL: NEGATIVE
SARS-COV+SARS-COV-2 AG RESP QL IA.RAPID: NEGATIVE

## 2022-12-30 PROCEDURE — 87880 STREP A ASSAY W/OPTIC: CPT | Performed by: NURSE PRACTITIONER

## 2022-12-30 PROCEDURE — 87804 INFLUENZA ASSAY W/OPTIC: CPT | Performed by: NURSE PRACTITIONER

## 2022-12-30 PROCEDURE — 99214 OFFICE O/P EST MOD 30 MIN: CPT | Performed by: NURSE PRACTITIONER

## 2022-12-30 PROCEDURE — 87426 SARSCOV CORONAVIRUS AG IA: CPT | Performed by: NURSE PRACTITIONER

## 2022-12-30 ASSESSMENT — FIBROSIS 4 INDEX: FIB4 SCORE: 1.41

## 2022-12-30 NOTE — ASSESSMENT & PLAN NOTE
New problem.  Symptoms started on Tuesday, include sore throat, chest congestion, PND.  No sick contacts.  Vitals today within normal limits.  Denies dyspnea or fever, no wheezing. Tried OTC lozenges, nyqual.

## 2022-12-30 NOTE — PROGRESS NOTES
Chief Complaint   Patient presents with    Pharyngitis     X3 days     Nasal Congestion    Cough    Constipation     X 3 days       HISTORY OF PRESENT ILLNESS: Patient is a 47 y.o. male, established patient who presents today to discuss medical problems as listed below:    Flu-like symptoms  New problem.  Symptoms started on Tuesday, include sore throat, chest congestion, PND.  No sick contacts.  Vitals today within normal limits.  Denies dyspnea or fever, no wheezing. Tried OTC lozenges, nyqual.    Patient Active Problem List    Diagnosis Date Noted    Flu-like symptoms 12/30/2022    Family history of brain aneurysm 07/09/2018    Elevated LDL cholesterol level 08/28/2017    Seasonal allergic rhinitis due to pollen 08/14/2017    Seborrheic keratoses 08/14/2017        Allergies: Augmentin    Current Outpatient Medications   Medication Sig Dispense Refill    sore throat spray (CHLORASEPTIC) 1.4 % Liquid Use 1 Spray in the mouth or throat 4 times a day as needed (sore throat). 177 mL 1    loratadine (CLARITIN) 10 MG Tab Take 10 mg by mouth every day.      Ascorbic Acid (VITAMIN C PO) Take  by mouth.      Multiple Vitamins-Minerals (CENTRUM ADULTS PO) Take  by mouth.       No current facility-administered medications for this visit.       Social History     Tobacco Use    Smoking status: Never    Smokeless tobacco: Never   Vaping Use    Vaping Use: Never used   Substance Use Topics    Alcohol use: No    Drug use: No     Social History     Social History Narrative    Not on file       Family History   Problem Relation Age of Onset    Arthritis Maternal Grandmother         OA    Dementia Maternal Grandmother     Hyperlipidemia Maternal Grandmother     Other Mother         Fibromyalgia    Arthritis Mother         OA    Hyperlipidemia Father     No Known Problems Brother     No Known Problems Brother     Stroke Neg Hx     Heart Attack Neg Hx     Cancer Neg Hx        Allergies, past medical history, past surgical history,  "family history, social history reviewed and updated.    Review of Systems:     - Constitutional: Negative for fever, chills, unexpected weight change, and fatigue/generalized weakness.     - HEENT: sore throat. Negative for headaches, vision changes, hearing changes, ear pain, ear discharge, rhinorrhea, sinus congestion,  and neck pain.      - Respiratory: chest congestion, cough. Negative for sputum production, , dyspnea, wheezing, and crackles.      - Cardiovascular: Negative for chest pain, palpitations, orthopnea, and bilateral lower extremity edema.     - Psychiatric/Behavioral: Negative for depression, suicidal/homicidal ideation and memory loss.      All other systems reviewed and are negative    Exam:    /80 (BP Location: Left arm, Patient Position: Sitting, BP Cuff Size: Adult)   Pulse 90   Temp 36.8 °C (98.3 °F) (Temporal)   Resp 16   Ht 1.676 m (5' 6\")   Wt 73 kg (160 lb 15 oz)   SpO2 97%   BMI 25.98 kg/m²  Body mass index is 25.98 kg/m².    Physical Exam:  Constitutional: Well-developed and well-nourished. Not diaphoretic. No distress.   Ears:  External ears unremarkable. Tympanic membranes clear and intact.  Nose: Nares patent. Septum midline. Turbinates without erythema nor edema. No discharge.   Mouth/Throat:  Posterior pharynx with mild erythema no exudates.  Neck:  No lymphadenopathy--cervical or supraclavicular.  Cardiovascular: Regular rate and rhythm, S1 and S2 without murmur, rubs, or gallops.    Chest: Effort normal. Clear to auscultation throughout. No adventitious sounds.   Neurological: Alert and oriented x 3.   Psychiatric:  Behavior, mood, and affect are appropriate.  MA/nursing note and vitals reviewed.    Assessment/Plan:  1. Flu-like symptoms  Rx, supportive care, warm fluids and rest.  Supplements as tolerated, vitamin C, vitamin D and zinc as recommended by .  Lozenges for sore throat over-the-counter.  Avoid cold fluids and dairy products as it is mucus " forming.  - POCT Rapid Strep A  - POCT Influenza A/B  - POCT SARS-COV Antigen ALBA (Symptomatic only)    2. Subacute cough      3. Chest congestion      4. PND (post-nasal drip)      5. Sore throat  - sore throat spray (CHLORASEPTIC) 1.4 % Liquid; Use 1 Spray in the mouth or throat 4 times a day as needed (sore throat).  Dispense: 177 mL; Refill: 1       Discussed with patient possible alternative diagnoses, patient is to take all medications as prescribed.      If symptoms persist FU w/PCP, if symptoms worsen go to emergency room.      If experiencing any side effects from prescribed medications report to the office immediately or go to emergency room.     Reviewed indication, dosage, usage and potential adverse effects of prescribed medications.      Reviewed risks and benefits of treatment plan. Patient verbalizes understanding of all instruction and verbally agrees to plan.     Discussed plan with the patient, and patient agrees to the above.      I personally reviewed prior external notes and test results pertinent to today's visit.      No follow-ups on file.

## 2023-01-03 ENCOUNTER — OFFICE VISIT (OUTPATIENT)
Dept: MEDICAL GROUP | Facility: LAB | Age: 48
End: 2023-01-03
Payer: COMMERCIAL

## 2023-01-03 ENCOUNTER — APPOINTMENT (OUTPATIENT)
Dept: RADIOLOGY | Facility: MEDICAL CENTER | Age: 48
End: 2023-01-03
Attending: FAMILY MEDICINE
Payer: COMMERCIAL

## 2023-01-03 VITALS
DIASTOLIC BLOOD PRESSURE: 78 MMHG | WEIGHT: 159 LBS | BODY MASS INDEX: 25.55 KG/M2 | HEIGHT: 66 IN | TEMPERATURE: 97.8 F | HEART RATE: 100 BPM | SYSTOLIC BLOOD PRESSURE: 120 MMHG | OXYGEN SATURATION: 94 % | RESPIRATION RATE: 12 BRPM

## 2023-01-03 DIAGNOSIS — J18.9 COMMUNITY ACQUIRED PNEUMONIA OF LEFT LOWER LOBE OF LUNG: ICD-10-CM

## 2023-01-03 DIAGNOSIS — J20.9 ACUTE BRONCHITIS, UNSPECIFIED ORGANISM: ICD-10-CM

## 2023-01-03 PROCEDURE — 71046 X-RAY EXAM CHEST 2 VIEWS: CPT

## 2023-01-03 PROCEDURE — 99214 OFFICE O/P EST MOD 30 MIN: CPT | Performed by: FAMILY MEDICINE

## 2023-01-03 RX ORDER — PREDNISONE 20 MG/1
20 TABLET ORAL DAILY
Qty: 5 TABLET | Refills: 0 | Status: SHIPPED | OUTPATIENT
Start: 2023-01-03 | End: 2023-01-08

## 2023-01-03 RX ORDER — ALBUTEROL SULFATE 90 UG/1
2 AEROSOL, METERED RESPIRATORY (INHALATION) EVERY 4 HOURS PRN
Qty: 6.7 G | Refills: 0 | Status: SHIPPED | OUTPATIENT
Start: 2023-01-03 | End: 2023-10-12

## 2023-01-03 RX ORDER — DOXYCYCLINE 100 MG/1
100 CAPSULE ORAL 2 TIMES DAILY
Qty: 10 CAPSULE | Refills: 0 | Status: SHIPPED | OUTPATIENT
Start: 2023-01-03 | End: 2023-01-08

## 2023-01-03 RX ORDER — BENZONATATE 100 MG/1
100 CAPSULE ORAL 3 TIMES DAILY PRN
Qty: 60 CAPSULE | Refills: 0 | Status: SHIPPED | OUTPATIENT
Start: 2023-01-03 | End: 2023-10-12

## 2023-01-03 ASSESSMENT — FIBROSIS 4 INDEX: FIB4 SCORE: 1.41

## 2023-01-03 NOTE — LETTER
January 3, 2023    To Whom It May Concern:         This is confirmation that Nicolas Green attended his scheduled appointment with Jose Carney M.D. on 1/03/23 for an acute illness. He can return to work on 1/10/23 or earlier if symptoms improve.         If you have any questions please do not hesitate to call me at the phone number listed below.    Sincerely,          Jose Carney M.D.  [phone]

## 2023-01-03 NOTE — PROGRESS NOTES
"Subjective:     CC: Continued cough, chest congestion    HPI:   Nicolas presents today with concern for continued cough and chest congestion after recent urgent care visit.  He was seen in urgent care 12/30 with negative point-of-care testing for flu, strep and COVID.    Since then he has had continued chest congestion cough with deep breaths and at night, and feeling of rattling in chest.  No fevers, does have some continued fatigue and headaches.    Little improvement with OTC treatments.    Medications, past medical history, allergies, and social history have been reviewed and updated.      Objective:       Exam:  /78 (BP Location: Left arm, Patient Position: Sitting, BP Cuff Size: Adult)   Pulse 100   Temp 36.6 °C (97.8 °F)   Resp 12   Ht 1.676 m (5' 6\")   Wt 72.1 kg (159 lb)   SpO2 94%   BMI 25.66 kg/m²  Body mass index is 25.66 kg/m².    Constitutional: Alert. Well appearing. No distress.  Skin: Warm, dry, good turgor, no visible rashes.  Eye: Equal, round and reactive to light, conjunctiva clear, lids normal.  ENMT: Moist mucous membranes. Normal dentition.  Oropharynx is clear.  Tympanic membranes are clear.  Respiratory: Normal effort.  There is bilateral inspiratory and expiratory wheezing.  Cardiovascular: Regular rate and rhythm. Normal S1/S2. No murmurs, rubs or gallops.   Neuro: Moves all four extremities. No facial droop.  Psych: Answers questions appropriately. Normal affect and mood.      Assessment & Plan:     47 y.o. male with the following -     1. Acute bronchitis, unspecified organism  Continued viral respiratory symptoms with cough, fatigue, bilateral wheezing.  He had negative point-of-care flu and COVID testing at urgent care 3 days ago.  We will do CXR for further evaluation of PNA but current symptoms are consistent with acute viral bronchitis.  Try prednisone burst and albuterol for symptom management.  Tessalon and dextromethorphan for cough.  Discussed reasons to seek " care.  - predniSONE (DELTASONE) 20 MG Tab; Take 1 Tablet by mouth every day for 5 days.  Dispense: 5 Tablet; Refill: 0  - albuterol 108 (90 Base) MCG/ACT Aero Soln inhalation aerosol; Inhale 2 Puffs every four hours as needed for Shortness of Breath.  Dispense: 6.7 g; Refill: 0  - DX-CHEST-2 VIEWS; Future  - benzonatate (TESSALON) 100 MG Cap; Take 1 Capsule by mouth 3 times a day as needed for Cough.  Dispense: 60 Capsule; Refill: 0      Please note that this note was created using voice recognition software.

## 2023-09-20 ENCOUNTER — APPOINTMENT (OUTPATIENT)
Dept: MEDICAL GROUP | Facility: LAB | Age: 48
End: 2023-09-20
Payer: COMMERCIAL

## 2023-10-09 SDOH — ECONOMIC STABILITY: HOUSING INSECURITY
IN THE LAST 12 MONTHS, WAS THERE A TIME WHEN YOU DID NOT HAVE A STEADY PLACE TO SLEEP OR SLEPT IN A SHELTER (INCLUDING NOW)?: NO

## 2023-10-09 SDOH — HEALTH STABILITY: PHYSICAL HEALTH: ON AVERAGE, HOW MANY MINUTES DO YOU ENGAGE IN EXERCISE AT THIS LEVEL?: PATIENT DECLINED

## 2023-10-09 SDOH — ECONOMIC STABILITY: INCOME INSECURITY: IN THE LAST 12 MONTHS, WAS THERE A TIME WHEN YOU WERE NOT ABLE TO PAY THE MORTGAGE OR RENT ON TIME?: NO

## 2023-10-09 SDOH — ECONOMIC STABILITY: FOOD INSECURITY: WITHIN THE PAST 12 MONTHS, YOU WORRIED THAT YOUR FOOD WOULD RUN OUT BEFORE YOU GOT MONEY TO BUY MORE.: NEVER TRUE

## 2023-10-09 SDOH — ECONOMIC STABILITY: TRANSPORTATION INSECURITY
IN THE PAST 12 MONTHS, HAS THE LACK OF TRANSPORTATION KEPT YOU FROM MEDICAL APPOINTMENTS OR FROM GETTING MEDICATIONS?: NO

## 2023-10-09 SDOH — ECONOMIC STABILITY: FOOD INSECURITY: WITHIN THE PAST 12 MONTHS, THE FOOD YOU BOUGHT JUST DIDN'T LAST AND YOU DIDN'T HAVE MONEY TO GET MORE.: NEVER TRUE

## 2023-10-09 SDOH — HEALTH STABILITY: MENTAL HEALTH
STRESS IS WHEN SOMEONE FEELS TENSE, NERVOUS, ANXIOUS, OR CAN'T SLEEP AT NIGHT BECAUSE THEIR MIND IS TROUBLED. HOW STRESSED ARE YOU?: NOT AT ALL

## 2023-10-09 SDOH — ECONOMIC STABILITY: TRANSPORTATION INSECURITY
IN THE PAST 12 MONTHS, HAS LACK OF TRANSPORTATION KEPT YOU FROM MEETINGS, WORK, OR FROM GETTING THINGS NEEDED FOR DAILY LIVING?: NO

## 2023-10-09 SDOH — ECONOMIC STABILITY: INCOME INSECURITY: HOW HARD IS IT FOR YOU TO PAY FOR THE VERY BASICS LIKE FOOD, HOUSING, MEDICAL CARE, AND HEATING?: NOT HARD AT ALL

## 2023-10-09 SDOH — HEALTH STABILITY: PHYSICAL HEALTH: ON AVERAGE, HOW MANY DAYS PER WEEK DO YOU ENGAGE IN MODERATE TO STRENUOUS EXERCISE (LIKE A BRISK WALK)?: 4 DAYS

## 2023-10-09 SDOH — ECONOMIC STABILITY: HOUSING INSECURITY

## 2023-10-09 SDOH — ECONOMIC STABILITY: TRANSPORTATION INSECURITY
IN THE PAST 12 MONTHS, HAS LACK OF RELIABLE TRANSPORTATION KEPT YOU FROM MEDICAL APPOINTMENTS, MEETINGS, WORK OR FROM GETTING THINGS NEEDED FOR DAILY LIVING?: NO

## 2023-10-09 ASSESSMENT — SOCIAL DETERMINANTS OF HEALTH (SDOH)
DO YOU BELONG TO ANY CLUBS OR ORGANIZATIONS SUCH AS CHURCH GROUPS UNIONS, FRATERNAL OR ATHLETIC GROUPS, OR SCHOOL GROUPS?: YES
HOW HARD IS IT FOR YOU TO PAY FOR THE VERY BASICS LIKE FOOD, HOUSING, MEDICAL CARE, AND HEATING?: NOT HARD AT ALL
WITHIN THE PAST 12 MONTHS, YOU WORRIED THAT YOUR FOOD WOULD RUN OUT BEFORE YOU GOT THE MONEY TO BUY MORE: NEVER TRUE
HOW OFTEN DO YOU ATTENT MEETINGS OF THE CLUB OR ORGANIZATION YOU BELONG TO?: MORE THAN 4 TIMES PER YEAR
HOW MANY DRINKS CONTAINING ALCOHOL DO YOU HAVE ON A TYPICAL DAY WHEN YOU ARE DRINKING: 1 OR 2
HOW OFTEN DO YOU GET TOGETHER WITH FRIENDS OR RELATIVES?: ONCE A WEEK
HOW OFTEN DO YOU ATTENT MEETINGS OF THE CLUB OR ORGANIZATION YOU BELONG TO?: MORE THAN 4 TIMES PER YEAR
HOW OFTEN DO YOU HAVE A DRINK CONTAINING ALCOHOL: MONTHLY OR LESS
IN A TYPICAL WEEK, HOW MANY TIMES DO YOU TALK ON THE PHONE WITH FAMILY, FRIENDS, OR NEIGHBORS?: THREE TIMES A WEEK
HOW OFTEN DO YOU HAVE SIX OR MORE DRINKS ON ONE OCCASION: NEVER
HOW OFTEN DO YOU GET TOGETHER WITH FRIENDS OR RELATIVES?: ONCE A WEEK
HOW OFTEN DO YOU ATTEND CHURCH OR RELIGIOUS SERVICES?: MORE THAN 4 TIMES PER YEAR
HOW OFTEN DO YOU ATTEND CHURCH OR RELIGIOUS SERVICES?: MORE THAN 4 TIMES PER YEAR
IN A TYPICAL WEEK, HOW MANY TIMES DO YOU TALK ON THE PHONE WITH FAMILY, FRIENDS, OR NEIGHBORS?: THREE TIMES A WEEK
DO YOU BELONG TO ANY CLUBS OR ORGANIZATIONS SUCH AS CHURCH GROUPS UNIONS, FRATERNAL OR ATHLETIC GROUPS, OR SCHOOL GROUPS?: YES

## 2023-10-09 ASSESSMENT — LIFESTYLE VARIABLES
AUDIT-C TOTAL SCORE: 1
HOW MANY STANDARD DRINKS CONTAINING ALCOHOL DO YOU HAVE ON A TYPICAL DAY: 1 OR 2
HOW OFTEN DO YOU HAVE A DRINK CONTAINING ALCOHOL: MONTHLY OR LESS
SKIP TO QUESTIONS 9-10: 1
HOW OFTEN DO YOU HAVE SIX OR MORE DRINKS ON ONE OCCASION: NEVER

## 2023-10-12 ENCOUNTER — OFFICE VISIT (OUTPATIENT)
Dept: MEDICAL GROUP | Facility: LAB | Age: 48
End: 2023-10-12
Payer: COMMERCIAL

## 2023-10-12 ENCOUNTER — HOSPITAL ENCOUNTER (OUTPATIENT)
Dept: LAB | Facility: MEDICAL CENTER | Age: 48
End: 2023-10-12
Attending: FAMILY MEDICINE
Payer: COMMERCIAL

## 2023-10-12 VITALS
SYSTOLIC BLOOD PRESSURE: 118 MMHG | TEMPERATURE: 97.1 F | DIASTOLIC BLOOD PRESSURE: 74 MMHG | HEIGHT: 66 IN | WEIGHT: 153.2 LBS | HEART RATE: 60 BPM | RESPIRATION RATE: 12 BRPM | OXYGEN SATURATION: 100 % | BODY MASS INDEX: 24.62 KG/M2

## 2023-10-12 DIAGNOSIS — Z00.00 WELL ADULT EXAM: ICD-10-CM

## 2023-10-12 DIAGNOSIS — Z12.11 SCREENING FOR COLORECTAL CANCER: ICD-10-CM

## 2023-10-12 DIAGNOSIS — Z11.59 ENCOUNTER FOR HEPATITIS C SCREENING TEST FOR LOW RISK PATIENT: ICD-10-CM

## 2023-10-12 DIAGNOSIS — Z12.12 SCREENING FOR COLORECTAL CANCER: ICD-10-CM

## 2023-10-12 DIAGNOSIS — B00.1 RECURRENT COLD SORES: ICD-10-CM

## 2023-10-12 PROBLEM — R68.89 FLU-LIKE SYMPTOMS: Status: RESOLVED | Noted: 2022-12-30 | Resolved: 2023-10-12

## 2023-10-12 LAB
ALBUMIN SERPL BCP-MCNC: 4.7 G/DL (ref 3.2–4.9)
ALBUMIN/GLOB SERPL: 2.1 G/DL
ALP SERPL-CCNC: 100 U/L (ref 30–99)
ALT SERPL-CCNC: 44 U/L (ref 2–50)
ANION GAP SERPL CALC-SCNC: 13 MMOL/L (ref 7–16)
AST SERPL-CCNC: 29 U/L (ref 12–45)
BASOPHILS # BLD AUTO: 1.2 % (ref 0–1.8)
BASOPHILS # BLD: 0.06 K/UL (ref 0–0.12)
BILIRUB SERPL-MCNC: 1.2 MG/DL (ref 0.1–1.5)
BUN SERPL-MCNC: 25 MG/DL (ref 8–22)
CALCIUM ALBUM COR SERPL-MCNC: 9.4 MG/DL (ref 8.5–10.5)
CALCIUM SERPL-MCNC: 10 MG/DL (ref 8.5–10.5)
CHLORIDE SERPL-SCNC: 103 MMOL/L (ref 96–112)
CHOLEST SERPL-MCNC: 236 MG/DL (ref 100–199)
CO2 SERPL-SCNC: 26 MMOL/L (ref 20–33)
CREAT SERPL-MCNC: 1.19 MG/DL (ref 0.5–1.4)
EOSINOPHIL # BLD AUTO: 0.28 K/UL (ref 0–0.51)
EOSINOPHIL NFR BLD: 5.5 % (ref 0–6.9)
ERYTHROCYTE [DISTWIDTH] IN BLOOD BY AUTOMATED COUNT: 39.3 FL (ref 35.9–50)
EST. AVERAGE GLUCOSE BLD GHB EST-MCNC: 100 MG/DL
FASTING STATUS PATIENT QL REPORTED: NORMAL
GFR SERPLBLD CREATININE-BSD FMLA CKD-EPI: 76 ML/MIN/1.73 M 2
GLOBULIN SER CALC-MCNC: 2.2 G/DL (ref 1.9–3.5)
GLUCOSE SERPL-MCNC: 95 MG/DL (ref 65–99)
HBA1C MFR BLD: 5.1 % (ref 4–5.6)
HCT VFR BLD AUTO: 48.4 % (ref 42–52)
HCV AB SER QL: NORMAL
HDLC SERPL-MCNC: 53 MG/DL
HGB BLD-MCNC: 16 G/DL (ref 14–18)
IMM GRANULOCYTES # BLD AUTO: 0.01 K/UL (ref 0–0.11)
IMM GRANULOCYTES NFR BLD AUTO: 0.2 % (ref 0–0.9)
LDLC SERPL CALC-MCNC: 155 MG/DL
LYMPHOCYTES # BLD AUTO: 1.61 K/UL (ref 1–4.8)
LYMPHOCYTES NFR BLD: 31.7 % (ref 22–41)
MCH RBC QN AUTO: 29.6 PG (ref 27–33)
MCHC RBC AUTO-ENTMCNC: 33.1 G/DL (ref 32.3–36.5)
MCV RBC AUTO: 89.6 FL (ref 81.4–97.8)
MONOCYTES # BLD AUTO: 0.49 K/UL (ref 0–0.85)
MONOCYTES NFR BLD AUTO: 9.6 % (ref 0–13.4)
NEUTROPHILS # BLD AUTO: 2.63 K/UL (ref 1.82–7.42)
NEUTROPHILS NFR BLD: 51.8 % (ref 44–72)
NRBC # BLD AUTO: 0 K/UL
NRBC BLD-RTO: 0 /100 WBC (ref 0–0.2)
PLATELET # BLD AUTO: 189 K/UL (ref 164–446)
PMV BLD AUTO: 11 FL (ref 9–12.9)
POTASSIUM SERPL-SCNC: 4.2 MMOL/L (ref 3.6–5.5)
PROT SERPL-MCNC: 6.9 G/DL (ref 6–8.2)
RBC # BLD AUTO: 5.4 M/UL (ref 4.7–6.1)
SODIUM SERPL-SCNC: 142 MMOL/L (ref 135–145)
TRIGL SERPL-MCNC: 139 MG/DL (ref 0–149)
WBC # BLD AUTO: 5.1 K/UL (ref 4.8–10.8)

## 2023-10-12 PROCEDURE — 80061 LIPID PANEL: CPT

## 2023-10-12 PROCEDURE — 3078F DIAST BP <80 MM HG: CPT | Performed by: FAMILY MEDICINE

## 2023-10-12 PROCEDURE — 99396 PREV VISIT EST AGE 40-64: CPT | Performed by: FAMILY MEDICINE

## 2023-10-12 PROCEDURE — 3074F SYST BP LT 130 MM HG: CPT | Performed by: FAMILY MEDICINE

## 2023-10-12 PROCEDURE — 36415 COLL VENOUS BLD VENIPUNCTURE: CPT

## 2023-10-12 PROCEDURE — 85025 COMPLETE CBC W/AUTO DIFF WBC: CPT

## 2023-10-12 PROCEDURE — 83036 HEMOGLOBIN GLYCOSYLATED A1C: CPT

## 2023-10-12 PROCEDURE — 80053 COMPREHEN METABOLIC PANEL: CPT

## 2023-10-12 PROCEDURE — 86803 HEPATITIS C AB TEST: CPT

## 2023-10-12 RX ORDER — ACYCLOVIR 50 MG/G
1 OINTMENT TOPICAL
Qty: 15 G | Refills: 3 | Status: CANCELLED | OUTPATIENT
Start: 2023-10-12

## 2023-10-12 RX ORDER — METHYLDOPA/HYDROCHLOROTHIAZIDE 250MG-25MG
TABLET ORAL
COMMUNITY

## 2023-10-12 RX ORDER — ACYCLOVIR 400 MG/1
400 TABLET ORAL 3 TIMES DAILY
Qty: 30 TABLET | Refills: 3 | Status: SHIPPED | OUTPATIENT
Start: 2023-10-12

## 2023-10-12 ASSESSMENT — PATIENT HEALTH QUESTIONNAIRE - PHQ9: CLINICAL INTERPRETATION OF PHQ2 SCORE: 0

## 2023-10-12 ASSESSMENT — FIBROSIS 4 INDEX: FIB4 SCORE: 1.44

## 2023-10-12 NOTE — PROGRESS NOTES
Subjective:     CC:   Chief Complaint   Patient presents with    Annual Exam    Requesting Labs     A1c, PSA    Cold Sores     Requesting Zovirax 5% cream        HPI:   Nicolas Green is a 48 y.o. male who presents for an annual exam. He is feeling well and has no complaints.    Last colonoscopy: no prior  Last Tdap:  2015  Qualify for abdominal us screen: No  Qualify for hep c screen: ordered  Regular exercise: active, hiking, works as   Diet: generally healthy    He  has a past medical history of Hyperlipidemia.    He has no past medical history of Diabetes (HCC) or Hypertension.  He  has no past surgical history on file.  Family History   Problem Relation Age of Onset    Arthritis Maternal Grandmother         OA    Dementia Maternal Grandmother     Hyperlipidemia Maternal Grandmother     Other Mother         Fibromyalgia    Arthritis Mother         OA    Hyperlipidemia Father     No Known Problems Brother     No Known Problems Brother     Stroke Neg Hx     Heart Attack Neg Hx     Cancer Neg Hx      Social History     Tobacco Use    Smoking status: Never    Smokeless tobacco: Never   Vaping Use    Vaping Use: Never used   Substance Use Topics    Alcohol use: No    Drug use: No       Patient Active Problem List    Diagnosis Date Noted    Flu-like symptoms 12/30/2022    Family history of brain aneurysm 07/09/2018    Elevated LDL cholesterol level 08/28/2017    Seasonal allergic rhinitis due to pollen 08/14/2017    Seborrheic keratoses 08/14/2017       Current Outpatient Medications   Medication Sig Dispense Refill    Zinc 20 MG Cap Take  by mouth.      Echinacea 125 MG Cap Take  by mouth.      Ascorbic Acid (VITAMIN C PO) Take  by mouth.      Multiple Vitamins-Minerals (CENTRUM ADULTS PO) Take  by mouth.      albuterol 108 (90 Base) MCG/ACT Aero Soln inhalation aerosol Inhale 2 Puffs every four hours as needed for Shortness of Breath. 6.7 g 0    benzonatate (TESSALON) 100 MG Cap Take 1 Capsule  "by mouth 3 times a day as needed for Cough. 60 Capsule 0    sore throat spray (CHLORASEPTIC) 1.4 % Liquid Use 1 Spray in the mouth or throat 4 times a day as needed (sore throat). 177 mL 1    loratadine (CLARITIN) 10 MG Tab Take 10 mg by mouth every day.       No current facility-administered medications for this visit.    (including changes today)  Allergies: Augmentin    Review of Systems   Constitutional: Negative for fever, chills and malaise/fatigue.   HENT: Negative for congestion.    Eyes: Negative for pain.   Respiratory: Negative for cough and shortness of breath.    Cardiovascular: Negative for leg swelling.   Gastrointestinal: Negative for nausea, vomiting, abdominal pain and diarrhea.   Genitourinary: Negative for dysuria and hematuria.   Skin: Negative for rash.   Neurological: Negative for dizziness, focal weakness and headaches.   Endo/Heme/Allergies: Does not bruise/bleed easily.   Psychiatric/Behavioral: Negative for depression.  The patient is not nervous/anxious.      Objective:     /74 (BP Location: Left arm, Patient Position: Sitting, BP Cuff Size: Adult)   Pulse 60   Temp 36.2 °C (97.1 °F)   Resp 12   Ht 1.676 m (5' 6\")   Wt 69.5 kg (153 lb 3.2 oz)   SpO2 100%   BMI 24.73 kg/m²   Body mass index is 24.73 kg/m².  Wt Readings from Last 4 Encounters:   10/12/23 69.5 kg (153 lb 3.2 oz)   01/03/23 72.1 kg (159 lb)   12/30/22 73 kg (160 lb 15 oz)   07/27/22 67.7 kg (149 lb 3.2 oz)       Physical Exam:  Constitutional: Well-developed and well-nourished. Not diaphoretic. No distress.   Skin: Skin is warm and dry. No rash noted. Resolving cold sore on upper lip.  Head: Atraumatic without lesions.  Eyes: Conjunctivae and extraocular motions are normal. Pupils are equal, round, and reactive to light. No scleral icterus.   Ears:  External ears unremarkable.  Nose: Nares patent. Septum midline.   Mouth/Throat: Dentition is normal. Tongue normal. Oropharynx is clear and moist. Posterior pharynx " without erythema or exudates.  Neck: Supple, trachea midline.   Cardiovascular: Regular rate and rhythm, S1 and S2 without murmur, rubs, or gallops.    Chest: Effort normal. Clear to auscultation throughout.  Extremities: No cyanosis, clubbing, erythema, nor edema. Distal pulses intact and symmetric.   Musculoskeletal: All major joints AROM full in all directions without pain.  Neurological: All 4 extremities.  No facial droop.  Moves all 4 extremities.  No facial droop.Moves all four extremities, no facial droop.  Psychiatric:  Behavior, mood, and affect are appropriate.    Assessment and Plan:     1. Well adult exam  Health maintenance reviewed and updated.  Age-appropriate anticipatory guidance provided.  - CBC WITH DIFFERENTIAL; Future  - Comp Metabolic Panel; Future  - Lipid Profile; Future  - HEMOGLOBIN A1C; Future    2. Recurrent cold sores  Acyclovir as needed for outbreaks  - acyclovir (ZOVIRAX) 400 MG tablet; Take 1 Tablet by mouth 3 times a day.  Dispense: 30 Tablet; Refill: 3    3. Screening for colorectal cancer  No prior colonoscopy.  - Referral to GI for Colonoscopy    4. Encounter for hepatitis C screening test for low risk patient  - HEP C VIRUS ANTIBODY; Future    Follow-up: Return in about 1 year (around 10/12/2024), or if symptoms worsen or fail to improve.    Please note that this note was created using voice recognition software.

## 2023-11-07 ENCOUNTER — OFFICE VISIT (OUTPATIENT)
Dept: MEDICAL GROUP | Facility: LAB | Age: 48
End: 2023-11-07
Payer: COMMERCIAL

## 2023-11-07 VITALS
DIASTOLIC BLOOD PRESSURE: 60 MMHG | WEIGHT: 150.8 LBS | OXYGEN SATURATION: 98 % | TEMPERATURE: 97.5 F | SYSTOLIC BLOOD PRESSURE: 96 MMHG | BODY MASS INDEX: 24.23 KG/M2 | HEIGHT: 66 IN | RESPIRATION RATE: 14 BRPM | HEART RATE: 72 BPM

## 2023-11-07 DIAGNOSIS — U07.1 COVID-19: ICD-10-CM

## 2023-11-07 DIAGNOSIS — J02.9 PHARYNGITIS, UNSPECIFIED ETIOLOGY: ICD-10-CM

## 2023-11-07 LAB
FLUAV RNA SPEC QL NAA+PROBE: NEGATIVE
FLUBV RNA SPEC QL NAA+PROBE: NEGATIVE
RSV RNA SPEC QL NAA+PROBE: NEGATIVE
S PYO DNA SPEC NAA+PROBE: NOT DETECTED
SARS-COV-2 RNA RESP QL NAA+PROBE: POSITIVE

## 2023-11-07 PROCEDURE — 99213 OFFICE O/P EST LOW 20 MIN: CPT | Performed by: NURSE PRACTITIONER

## 2023-11-07 PROCEDURE — 87651 STREP A DNA AMP PROBE: CPT | Performed by: NURSE PRACTITIONER

## 2023-11-07 PROCEDURE — 0241U POCT CEPHEID COV-2, FLU A/B, RSV - PCR: CPT | Performed by: NURSE PRACTITIONER

## 2023-11-07 PROCEDURE — 3078F DIAST BP <80 MM HG: CPT | Performed by: NURSE PRACTITIONER

## 2023-11-07 PROCEDURE — 3074F SYST BP LT 130 MM HG: CPT | Performed by: NURSE PRACTITIONER

## 2023-11-07 ASSESSMENT — FIBROSIS 4 INDEX: FIB4 SCORE: 1.06

## 2023-11-07 NOTE — PROGRESS NOTES
"Subjective:     CC:   Chief Complaint   Patient presents with    Sore Throat     Congestion , green phlegm x Saturday      HPI:   Nicolas presents today with the following:    Pharyngitis  Onset 3 days ago. Reports sore throat, congestion, green mucus, painful to swallow. Throat feels worse overnight.   Denies sinus pain/pressure, fever, chills, myalgias, headache, cough, shortness of breath, lymphadenopathy.   No known sick contacts.  Has been taking OTC cold medication with some relief.     ROS:   As documented in history of present illness above    Objective:     Exam: BP 96/60 (BP Location: Right arm, Patient Position: Sitting, BP Cuff Size: Adult)   Pulse 72   Temp 36.4 °C (97.5 °F)   Resp 14   Ht 1.676 m (5' 6\")   Wt 68.4 kg (150 lb 12.8 oz)   SpO2 98%  Body mass index is 24.34 kg/m².    Constitutional: Alert, no distress, plus 3 vital signs  Skin:  Warm, dry, no rashes invisible areas  Eye: Equal, round and reactive, conjunctiva clear  ENMT: Bilateral tympanic membranes are retracted but translucent without erythema or perforation. Positive bilateral maxillary sinus tenderness. Oropharynx is slightly injected without exudate. No tonsillar enlargement.    Neck: Mild anterior cervical, nontender lymphadenopathy  Respiratory: Unlabored respiration, lungs clear to auscultation, no wheezes, no rhonchi  Cardiovascular: Normal rate and rhythm  Psych: Alert, pleasant, well-groomed, normal affect    Assessment & Plan:     48 y.o. male with the following -     1. Pharyngitis, unspecified etiology  2. COVID-19  POCT Covid positive. Patient to follow employer (if employed), county, local, state, national guidelines for protection.  Patient to follow CDC guidelines for recommendations and about clearing from Covid and returning to work. Patient can take over-the-counter Tylenol for pain, fever, discomfort, and other over-the-counter medications for symptom relief.    - POCT CoV-2, Flu A/B, RSV by PCR  - POCT " CEPHEID GROUP A STREP - PCR

## 2023-11-29 DIAGNOSIS — J01.40 ACUTE NON-RECURRENT PANSINUSITIS: ICD-10-CM

## 2023-11-29 RX ORDER — DOXYCYCLINE HYCLATE 100 MG
100 TABLET ORAL 2 TIMES DAILY
Qty: 20 TABLET | Refills: 0 | Status: SHIPPED | OUTPATIENT
Start: 2023-11-29

## 2024-01-03 NOTE — ASSESSMENT & PLAN NOTE
New to discuss, uncontrolled problem.  He reported 4 days history of intermittent epigastric pain.  Pain comes on while he is resting, frequently at the end of the day or wakes him up from sleep at 3 in the morning.  Does not have been while he is around and exercising and has no relation to food.  Denies any associated lack of appetite, nausea, vomiting, changes to his bowel habits such as diarrhea or constipation.  His bowel habits has been regular and his last bowel movement this morning was normal in color.  Denies any GI bleeding or melena.  He stated that the pain does not radiate, mostly crampy in nature, it continues for couple of hours at a time and resolves spontaneously.  He tries over-the-counter Gas-X without improvement.  He denies any preceding fever or chills/flulike symptoms.  Denies changes to his urinary symptoms.  He is does not normally drink alcohol but has 2 cranberries and whiskey couple of weeks ago.    No previous similar episode.  He did report history of IBS many years ago but mainly lower GI symptoms.      
intact

## 2024-03-26 ENCOUNTER — OFFICE VISIT (OUTPATIENT)
Dept: MEDICAL GROUP | Facility: LAB | Age: 49
End: 2024-03-26
Payer: COMMERCIAL

## 2024-03-26 VITALS
OXYGEN SATURATION: 97 % | HEART RATE: 80 BPM | BODY MASS INDEX: 24.91 KG/M2 | TEMPERATURE: 98.4 F | RESPIRATION RATE: 14 BRPM | HEIGHT: 66 IN | SYSTOLIC BLOOD PRESSURE: 104 MMHG | DIASTOLIC BLOOD PRESSURE: 78 MMHG | WEIGHT: 155 LBS

## 2024-03-26 DIAGNOSIS — J02.8 PHARYNGITIS DUE TO OTHER ORGANISM: ICD-10-CM

## 2024-03-26 DIAGNOSIS — J06.9 UPPER RESPIRATORY TRACT INFECTION, UNSPECIFIED TYPE: ICD-10-CM

## 2024-03-26 DIAGNOSIS — H10.32 ACUTE CONJUNCTIVITIS OF LEFT EYE, UNSPECIFIED ACUTE CONJUNCTIVITIS TYPE: ICD-10-CM

## 2024-03-26 LAB
FLUAV RNA SPEC QL NAA+PROBE: NEGATIVE
FLUBV RNA SPEC QL NAA+PROBE: NEGATIVE
RSV RNA SPEC QL NAA+PROBE: NEGATIVE
SARS-COV-2 RNA RESP QL NAA+PROBE: NEGATIVE

## 2024-03-26 PROCEDURE — 0241U POCT CEPHEID COV-2, FLU A/B, RSV - PCR: CPT | Performed by: INTERNAL MEDICINE

## 2024-03-26 PROCEDURE — 99214 OFFICE O/P EST MOD 30 MIN: CPT | Performed by: INTERNAL MEDICINE

## 2024-03-26 PROCEDURE — 3074F SYST BP LT 130 MM HG: CPT | Performed by: INTERNAL MEDICINE

## 2024-03-26 PROCEDURE — 3078F DIAST BP <80 MM HG: CPT | Performed by: INTERNAL MEDICINE

## 2024-03-26 RX ORDER — TOBRAMYCIN 3 MG/ML
1 SOLUTION/ DROPS OPHTHALMIC EVERY 4 HOURS
Qty: 5 ML | Refills: 0 | Status: SHIPPED | OUTPATIENT
Start: 2024-03-26

## 2024-03-26 RX ORDER — AZITHROMYCIN 250 MG/1
TABLET, FILM COATED ORAL
Qty: 6 TABLET | Refills: 0 | Status: SHIPPED | OUTPATIENT
Start: 2024-03-26

## 2024-03-26 ASSESSMENT — FIBROSIS 4 INDEX: FIB4 SCORE: 1.06

## 2024-03-27 NOTE — PROGRESS NOTES
CC: Nicolas Green is a 48 y.o. male is suffering from   Chief Complaint   Patient presents with    Pharyngitis    Sinus Problem    Conjunctivitis         SUBJECTIVE:  1. Pharyngitis due to other organism  Bib is suffering from pharyngitis suspect probable bacterial    2. Upper respiratory tract infection, unspecified type  Upper respiratory tract infection I will start azithromycin    3. Acute conjunctivitis of left eye, unspecified acute conjunctivitis type  Start tobramycin        Past social, family, history:   Social History     Tobacco Use    Smoking status: Never    Smokeless tobacco: Never   Vaping Use    Vaping Use: Never used   Substance Use Topics    Alcohol use: No    Drug use: No         MEDICATIONS:    Current Outpatient Medications:     azithromycin (ZITHROMAX) 250 MG Tab, Two day one then qd x 4., Disp: 6 Tablet, Rfl: 0    tobramycin (TOBREX) 0.3 % Solution ophthalmic solution, Administer 1 Drop into the left eye every 4 hours., Disp: 5 mL, Rfl: 0    doxycycline (VIBRAMYCIN) 100 MG Tab, Take 1 Tablet by mouth 2 times a day., Disp: 20 Tablet, Rfl: 0    Zinc 20 MG Cap, Take  by mouth., Disp: , Rfl:     Echinacea 125 MG Cap, Take  by mouth., Disp: , Rfl:     acyclovir (ZOVIRAX) 400 MG tablet, Take 1 Tablet by mouth 3 times a day., Disp: 30 Tablet, Rfl: 3    Ascorbic Acid (VITAMIN C PO), Take  by mouth., Disp: , Rfl:     Multiple Vitamins-Minerals (CENTRUM ADULTS PO), Take  by mouth., Disp: , Rfl:     PROBLEMS:  Patient Active Problem List    Diagnosis Date Noted    Family history of brain aneurysm 07/09/2018    Elevated LDL cholesterol level 08/28/2017    Seasonal allergic rhinitis due to pollen 08/14/2017    Seborrheic keratoses 08/14/2017       REVIEW OF SYSTEMS:  Gen.:  No Nausea, Vomiting, fever, Chills.  Heart: No chest pain.  Lungs:  No shortness of Breath.  Psychological: Max unusual Anxiety depression     PHYSICAL EXAM   Constitutional: Alert, cooperative, not in acute  "distress.  Cardiovascular:  Rate Rhythm is regular without murmurs rubs clicks.     Thorax & Lungs: Clear to auscultation, no wheezing, rhonchi, or rales  HENT: Normocephalic, Atraumatic.  Eyes: PERRLA, EOMI, Conjunctiva normal.   Neck: Trachia is midline no swelling of the thyroid.   Neurologic: Alert & oriented x 3, cranial nerves II through XII are intact, Normal motor function, Normal sensory function, No focal deficits noted.   Psychiatric: Affect normal, Judgment normal, Mood normal.     VITAL SIGNS:/78 (BP Location: Left arm, Patient Position: Sitting, BP Cuff Size: Adult)   Pulse 80   Temp 36.9 °C (98.4 °F)   Resp 14   Ht 1.676 m (5' 6\")   Wt 70.3 kg (155 lb)   SpO2 97%   BMI 25.02 kg/m²     Labs: Reviewed    Assessment:                                                     Plan:    1. Pharyngitis due to other organism  Testing completed negative for COVID, influenza AB, RSV  - POCT Cepheid CoV-2, Flu A/B, RSV - PCR    2. Upper respiratory tract infection, unspecified type  Start azithromycin  - azithromycin (ZITHROMAX) 250 MG Tab; Two day one then qd x 4.  Dispense: 6 Tablet; Refill: 0    3. Acute conjunctivitis of left eye, unspecified acute conjunctivitis type  Start tobramycin  - tobramycin (TOBREX) 0.3 % Solution ophthalmic solution; Administer 1 Drop into the left eye every 4 hours.  Dispense: 5 mL; Refill: 0        "

## 2024-04-21 ENCOUNTER — PATIENT MESSAGE (OUTPATIENT)
Dept: MEDICAL GROUP | Facility: LAB | Age: 49
End: 2024-04-21
Payer: COMMERCIAL

## 2024-04-21 DIAGNOSIS — E78.00 ELEVATED LDL CHOLESTEROL LEVEL: ICD-10-CM

## 2024-04-29 ENCOUNTER — OFFICE VISIT (OUTPATIENT)
Dept: MEDICAL GROUP | Facility: LAB | Age: 49
End: 2024-04-29
Payer: COMMERCIAL

## 2024-04-29 VITALS
SYSTOLIC BLOOD PRESSURE: 114 MMHG | BODY MASS INDEX: 24.27 KG/M2 | OXYGEN SATURATION: 97 % | WEIGHT: 151 LBS | HEIGHT: 66 IN | DIASTOLIC BLOOD PRESSURE: 66 MMHG | RESPIRATION RATE: 14 BRPM | TEMPERATURE: 98.2 F | HEART RATE: 86 BPM

## 2024-04-29 DIAGNOSIS — J01.40 ACUTE NON-RECURRENT PANSINUSITIS: ICD-10-CM

## 2024-04-29 RX ORDER — DOXYCYCLINE HYCLATE 100 MG
100 TABLET ORAL 2 TIMES DAILY
Qty: 20 TABLET | Refills: 0 | Status: SHIPPED | OUTPATIENT
Start: 2024-04-29

## 2024-04-29 RX ORDER — METHYLPREDNISOLONE 4 MG/1
TABLET ORAL
Qty: 21 TABLET | Refills: 0 | Status: SHIPPED | OUTPATIENT
Start: 2024-04-29

## 2024-04-29 ASSESSMENT — FIBROSIS 4 INDEX: FIB4 SCORE: 1.06

## 2024-04-29 NOTE — PROGRESS NOTES
"Subjective:     CC:   Chief Complaint   Patient presents with    Cough     Congestion x 5 weeks      HPI:   Nicolas presents today with the following:    Sinusitis  Was seen previously for the same on 3/26. Reports that he has been sick for about 5 weeks. Reports productive cough, congestion, sinus pressure.   Denies fever, chills, headaches.    Worse at night.   Has been using OTC cold medication.   Was prescribed azithromycin at last appointment, and did feel some mild relief, but symptoms returned.     ROS:   As documented in history of present illness above    Objective:     Exam: /66 (BP Location: Right arm, Patient Position: Sitting, BP Cuff Size: Adult)   Pulse 86   Temp 36.8 °C (98.2 °F)   Resp 14   Ht 1.676 m (5' 6\")   Wt 68.5 kg (151 lb)   SpO2 97%  Body mass index is 24.37 kg/m².    Constitutional: Alert, no distress, plus 3 vital signs  Skin:  Warm, dry, no rashes invisible areas  Eye: Equal, round and reactive, conjunctiva clear  ENMT: Bilateral tympanic membranes are retracted but translucent without erythema or perforation. Positive bilateral maxillary sinus tenderness. Oropharynx is slightly injected without exudate. No tonsillar enlargement.    Neck: Mild anterior cervical, nontender lymphadenopathy  Respiratory: Unlabored respiration, lungs clear to auscultation, no wheezes, no rhonchi  Cardiovascular: Normal rate and rhythm  Psych: Alert, pleasant, well-groomed, normal affect    Assessment & Plan:     48 y.o. male with the following -     1. Acute non-recurrent pansinusitis  Patient to take antibiotic as directed, potential side effects of medication discussed. Probiotic use encouraged. Flonase as directed. Sleep with HOB elevated, humidifier at night, rest, increase fluid intake. Supportive care, differential diagnoses, and indications for immediate follow-up discussed with patient. Pathogenesis of diagnosis discussed including typical length and natural progression. Instructed to " return to clinic for any change in condition, further concerns, or worsening of symptoms. Patient states understanding of the plan of care and discharge instructions.  - doxycycline (VIBRAMYCIN) 100 MG Tab; Take 1 Tablet by mouth 2 times a day.  Dispense: 20 Tablet; Refill: 0  - methylPREDNISolone (MEDROL DOSEPAK) 4 MG Tablet Therapy Pack; As directed on the packaging label.  Dispense: 21 Tablet; Refill: 0

## 2024-05-14 ENCOUNTER — HOSPITAL ENCOUNTER (OUTPATIENT)
Dept: LAB | Facility: MEDICAL CENTER | Age: 49
End: 2024-05-14
Attending: FAMILY MEDICINE
Payer: COMMERCIAL

## 2024-05-14 DIAGNOSIS — E78.00 ELEVATED LDL CHOLESTEROL LEVEL: ICD-10-CM

## 2024-05-14 LAB
CHOLEST SERPL-MCNC: 202 MG/DL (ref 100–199)
FASTING STATUS PATIENT QL REPORTED: NORMAL
HDLC SERPL-MCNC: 48 MG/DL
LDLC SERPL CALC-MCNC: 122 MG/DL
TRIGL SERPL-MCNC: 160 MG/DL (ref 0–149)

## 2024-06-10 ENCOUNTER — APPOINTMENT (RX ONLY)
Dept: URBAN - METROPOLITAN AREA CLINIC 22 | Facility: CLINIC | Age: 49
Setting detail: DERMATOLOGY
End: 2024-06-10

## 2024-06-10 DIAGNOSIS — Z71.89 OTHER SPECIFIED COUNSELING: ICD-10-CM

## 2024-06-10 DIAGNOSIS — L81.4 OTHER MELANIN HYPERPIGMENTATION: ICD-10-CM

## 2024-06-10 DIAGNOSIS — D22 MELANOCYTIC NEVI: ICD-10-CM

## 2024-06-10 DIAGNOSIS — D18.0 HEMANGIOMA: ICD-10-CM

## 2024-06-10 DIAGNOSIS — L91.8 OTHER HYPERTROPHIC DISORDERS OF THE SKIN: ICD-10-CM

## 2024-06-10 PROBLEM — D22.5 MELANOCYTIC NEVI OF TRUNK: Status: ACTIVE | Noted: 2024-06-10

## 2024-06-10 PROBLEM — D23.39 OTHER BENIGN NEOPLASM OF SKIN OF OTHER PARTS OF FACE: Status: ACTIVE | Noted: 2024-06-10

## 2024-06-10 PROBLEM — D18.01 HEMANGIOMA OF SKIN AND SUBCUTANEOUS TISSUE: Status: ACTIVE | Noted: 2024-06-10

## 2024-06-10 PROCEDURE — ? SUNSCREEN RECOMMENDATIONS

## 2024-06-10 PROCEDURE — 99213 OFFICE O/P EST LOW 20 MIN: CPT | Mod: 25

## 2024-06-10 PROCEDURE — ? LIQUID NITROGEN

## 2024-06-10 PROCEDURE — 17111 DESTRUCTION B9 LESIONS 15/>: CPT

## 2024-06-10 PROCEDURE — ? COUNSELING

## 2024-06-10 ASSESSMENT — LOCATION ZONE DERM
LOCATION ZONE: TRUNK
LOCATION ZONE: NECK
LOCATION ZONE: AXILLAE

## 2024-06-10 ASSESSMENT — LOCATION DETAILED DESCRIPTION DERM
LOCATION DETAILED: RIGHT AXILLARY VAULT
LOCATION DETAILED: RIGHT CLAVICULAR SKIN
LOCATION DETAILED: LEFT SUPERIOR MEDIAL UPPER BACK
LOCATION DETAILED: LEFT POSTERIOR AXILLA
LOCATION DETAILED: LEFT CLAVICULAR SKIN
LOCATION DETAILED: EPIGASTRIC SKIN
LOCATION DETAILED: RIGHT CLAVICULAR NECK
LOCATION DETAILED: RIGHT POSTERIOR AXILLA
LOCATION DETAILED: LEFT CLAVICULAR NECK

## 2024-06-10 ASSESSMENT — LOCATION SIMPLE DESCRIPTION DERM
LOCATION SIMPLE: LEFT ANTERIOR NECK
LOCATION SIMPLE: LEFT UPPER BACK
LOCATION SIMPLE: RIGHT AXILLARY VAULT
LOCATION SIMPLE: RIGHT CLAVICULAR SKIN
LOCATION SIMPLE: LEFT POSTERIOR AXILLA
LOCATION SIMPLE: RIGHT POSTERIOR AXILLA
LOCATION SIMPLE: LEFT CLAVICULAR SKIN
LOCATION SIMPLE: ABDOMEN
LOCATION SIMPLE: RIGHT ANTERIOR NECK

## 2024-06-10 NOTE — PROCEDURE: MIPS QUALITY
Detail Level: Detailed
Quality 226: Preventive Care And Screening: Tobacco Use: Screening And Cessation Intervention: Patient screened for tobacco use and is an ex/non-smoker
Quality 130: Documentation Of Current Medications In The Medical Record: Current Medications Documented
Solaraze Counseling:  I discussed with the patient the risks of Solaraze including but not limited to erythema, scaling, itching, weeping, crusting, and pain.

## 2024-06-10 NOTE — PROCEDURE: LIQUID NITROGEN
Application Tool (Optional): Forceps
Number Of Freeze-Thaw Cycles: 3 freeze-thaw cycles
Render Post-Care Instructions In Note?: yes
Medical Necessity Information: It is in your best interest to select a reason for this procedure from the list below. All of these items fulfill various CMS LCD requirements except the new and changing color options.
Medical Necessity Clause: This procedure was medically necessary because the lesions that were treated were:
Render Note In Bullet Format When Appropriate: No
Detail Level: Detailed
Post-Care Instructions: I reviewed with the patient in detail post-care instructions. Patient is to wear sunprotection, and avoid picking at any of the treated lesions. Pt may apply Vaseline to crusted or scabbing areas.
Duration Of Freeze Thaw-Cycle (Seconds): 3
Spray Paint Text: The liquid nitrogen was applied to the skin utilizing a spray paint frosting technique.
Consent: The patient's consent was obtained including but not limited to risks of crusting, scabbing, blistering, scarring, darker or lighter pigmentary change, recurrence, incomplete removal and infection.

## 2024-07-23 ENCOUNTER — HOSPITAL ENCOUNTER (OUTPATIENT)
Dept: RADIOLOGY | Facility: MEDICAL CENTER | Age: 49
End: 2024-07-23
Attending: NEUROLOGICAL SURGERY
Payer: COMMERCIAL

## 2024-07-23 DIAGNOSIS — Z82.49 FAMILY HISTORY OF ISCHEMIC HEART DISEASE: ICD-10-CM

## 2024-07-23 PROCEDURE — 70544 MR ANGIOGRAPHY HEAD W/O DYE: CPT

## 2024-09-21 ENCOUNTER — OFFICE VISIT (OUTPATIENT)
Dept: URGENT CARE | Facility: CLINIC | Age: 49
End: 2024-09-21
Payer: COMMERCIAL

## 2024-09-21 VITALS
SYSTOLIC BLOOD PRESSURE: 126 MMHG | HEART RATE: 111 BPM | TEMPERATURE: 98.6 F | WEIGHT: 157 LBS | BODY MASS INDEX: 25.23 KG/M2 | OXYGEN SATURATION: 93 % | HEIGHT: 66 IN | RESPIRATION RATE: 20 BRPM | DIASTOLIC BLOOD PRESSURE: 74 MMHG

## 2024-09-21 DIAGNOSIS — U07.1 COVID: ICD-10-CM

## 2024-09-21 LAB
FLUAV RNA SPEC QL NAA+PROBE: NEGATIVE
FLUBV RNA SPEC QL NAA+PROBE: NEGATIVE
RSV RNA SPEC QL NAA+PROBE: NEGATIVE
SARS-COV-2 RNA RESP QL NAA+PROBE: POSITIVE

## 2024-09-21 PROCEDURE — 3074F SYST BP LT 130 MM HG: CPT

## 2024-09-21 PROCEDURE — 99213 OFFICE O/P EST LOW 20 MIN: CPT

## 2024-09-21 PROCEDURE — 0241U POCT CEPHEID COV-2, FLU A/B, RSV - PCR: CPT

## 2024-09-21 PROCEDURE — 3078F DIAST BP <80 MM HG: CPT

## 2024-09-21 ASSESSMENT — ENCOUNTER SYMPTOMS
FEVER: 1
SPUTUM PRODUCTION: 0
SHORTNESS OF BREATH: 1
COUGH: 1
WHEEZING: 0
MYALGIAS: 0

## 2024-09-21 ASSESSMENT — FIBROSIS 4 INDEX: FIB4 SCORE: 1.09

## 2024-09-22 NOTE — PROGRESS NOTES
Verbal consent was acquired by the patient to use [employer] ambient listening note generation during this visit   Subjective:   Nicolas Green is a 49 y.o. male who presents for Headache (X 3 days/ coughing/ lack of energy/ phlegm )      HPI:  History of Present Illness  The patient is a 49-year-old male who presents for evaluation of fever, headache, cough, and congestion. He is accompanied by an adult female.    He has been experiencing headaches, fever, and the production of thick green phlegm when coughing for the past 3 days. His cough intensifies at night, and he has been running a fever, with the highest recorded temperature being 101.6°F. He also reports congestion, low energy, minor body aches, and redness in his left eye.     He has been managing his symptoms with Tylenol, Sudafed, Mucinex, NyQuil, and ibuprofen. His wife was diagnosed with COVID-19 two days ago, but his own home tests have returned negative results. His symptoms began on Wednesday, following a flight home on Tuesday.    He reports no wheezing or shortness of breath and has no history of lung or heart conditions. He typically falls ill once a year.         Review of Systems   Constitutional:  Positive for fever and malaise/fatigue.   HENT:  Positive for congestion.    Respiratory:  Positive for cough and shortness of breath. Negative for sputum production and wheezing.    Musculoskeletal:  Negative for myalgias.       Medications:    Current Outpatient Medications on File Prior to Visit   Medication Sig Dispense Refill    meloxicam (MOBIC) 15 MG tablet Take 1 Tablet by mouth every day. 30 Tablet 0    doxycycline (VIBRAMYCIN) 100 MG Tab Take 1 Tablet by mouth 2 times a day. 20 Tablet 0    azithromycin (ZITHROMAX) 250 MG Tab Two day one then qd x 4. 6 Tablet 0    tobramycin (TOBREX) 0.3 % Solution ophthalmic solution Administer 1 Drop into the left eye every 4 hours. 5 mL 0    Zinc 20 MG Cap Take  by mouth.      Echinacea 125 MG  "Cap Take  by mouth.      acyclovir (ZOVIRAX) 400 MG tablet Take 1 Tablet by mouth 3 times a day. 30 Tablet 3    Ascorbic Acid (VITAMIN C PO) Take  by mouth.      Multiple Vitamins-Minerals (CENTRUM ADULTS PO) Take  by mouth.      methylPREDNISolone (MEDROL DOSEPAK) 4 MG Tablet Therapy Pack As directed on the packaging label. (Patient not taking: Reported on 9/21/2024) 21 Tablet 0     No current facility-administered medications on file prior to visit.        Allergies:   Augmentin    Problem List:   Patient Active Problem List   Diagnosis    Seasonal allergic rhinitis due to pollen    Seborrheic keratoses    Elevated LDL cholesterol level    Family history of brain aneurysm        Surgical History:  No past surgical history on file.    Past Social Hx:   Social History     Tobacco Use    Smoking status: Never    Smokeless tobacco: Never   Vaping Use    Vaping status: Never Used   Substance Use Topics    Alcohol use: No    Drug use: No          Problem list, medications, and allergies reviewed by myself today in Epic.     Objective:     /74   Pulse (!) 111   Temp 37 °C (98.6 °F)   Resp 20   Ht 1.676 m (5' 6\")   Wt 71.2 kg (157 lb)   SpO2 93%   BMI 25.34 kg/m²     Physical Exam  Vitals and nursing note reviewed.   Constitutional:       General: He is not in acute distress.     Appearance: Normal appearance. He is normal weight. He is ill-appearing. He is not toxic-appearing or diaphoretic.   HENT:      Head: Normocephalic and atraumatic.      Right Ear: Tympanic membrane, ear canal and external ear normal. There is no impacted cerumen.      Left Ear: Tympanic membrane, ear canal and external ear normal. There is no impacted cerumen.      Nose: Congestion present. No rhinorrhea.      Mouth/Throat:      Mouth: Mucous membranes are moist.      Pharynx: Oropharynx is clear. No oropharyngeal exudate or posterior oropharyngeal erythema.   Cardiovascular:      Rate and Rhythm: Regular rhythm. Tachycardia present. "      Pulses: Normal pulses.      Heart sounds: Normal heart sounds. No murmur heard.     No friction rub. No gallop.   Pulmonary:      Effort: Pulmonary effort is normal. No respiratory distress.      Breath sounds: Normal breath sounds. No stridor. No wheezing, rhonchi or rales.   Chest:      Chest wall: No tenderness.   Musculoskeletal:      Cervical back: Normal range of motion and neck supple. No tenderness.   Skin:     General: Skin is warm and dry.      Capillary Refill: Capillary refill takes less than 2 seconds.   Neurological:      General: No focal deficit present.      Mental Status: He is alert and oriented to person, place, and time. Mental status is at baseline.      Cranial Nerves: No cranial nerve deficit.      Motor: No weakness.      Gait: Gait normal.   Psychiatric:         Mood and Affect: Mood normal.         Behavior: Behavior normal.         Thought Content: Thought content normal.         Judgment: Judgment normal.         Assessment/Plan:     Diagnosis and associated orders:   1. COVID  - POCT CoV-2, Flu A/B, RSV by PCR    Results for orders placed or performed in visit on 09/21/24   POCT CoV-2, Flu A/B, RSV by PCR   Result Value Ref Range    SARS-CoV-2 by PCR Positive (A) Negative, Invalid    Influenza virus A RNA Negative Negative, Invalid    Influenza virus B, PCR Negative Negative, Invalid    RSV, PCR Negative Negative, Invalid       Comments/MDM:   Pt is clinically stable at today's acute urgent care visit.  No acute distress noted. Appropriate for outpatient management at this time.     Assessment & Plan  1.  COVID  COVID testing positive in clinic today.  Discussed current CDC guidelines regarding isolation with the patient. I have recommended supportive care to include; Increased fluids, rest, over-the-counter cold and flu medications, alternating Tylenol and/or ibuprofen per manufactures instructions for symptomatic relief and fevers, and the use of a humidifier. Patient is to  return to UC or go to ER for any new or worsening s/s, and follow up with PCP for recheck. Patient is agreeable with plan of care and verbalized good understanding.   Work note was provided           Discussed DDx, management options (risks,benefits, and alternatives to planned treatment), natural progression and supportive care.  Expressed understanding and the treatment plan was agreed upon. Questions were encouraged and answered   Return to urgent care prn if new or worsening sx or if there is no improvement in condition prn.    Educated in Red flags and indications to immediately call 911 or present to the Emergency Department.   Advised the patient to follow-up with the primary care physician for recheck, reevaluation, and consideration of further management.    I personally reviewed prior external notes and test results pertinent to today's visit.  I have independently reviewed and interpreted all diagnostics ordered during this urgent care acute visit.         Please note that this dictation was created using voice recognition software. I have made a reasonable attempt to correct obvious errors, but I expect that there are errors of grammar and possibly content that I did not discover before finalizing the note.    This note was electronically signed by ZAMZAM Huggins

## 2024-09-23 ENCOUNTER — APPOINTMENT (OUTPATIENT)
Dept: MEDICAL GROUP | Facility: LAB | Age: 49
End: 2024-09-23
Payer: COMMERCIAL

## 2024-09-23 ENCOUNTER — PATIENT MESSAGE (OUTPATIENT)
Dept: MEDICAL GROUP | Facility: LAB | Age: 49
End: 2024-09-23

## 2024-09-23 ENCOUNTER — TELEPHONE (OUTPATIENT)
Dept: URGENT CARE | Facility: CLINIC | Age: 49
End: 2024-09-23

## 2024-09-23 DIAGNOSIS — U07.1 COVID-19: ICD-10-CM

## 2024-09-26 ENCOUNTER — OFFICE VISIT (OUTPATIENT)
Dept: MEDICAL GROUP | Facility: LAB | Age: 49
End: 2024-09-26
Payer: COMMERCIAL

## 2024-09-26 VITALS
TEMPERATURE: 98.3 F | BODY MASS INDEX: 24.43 KG/M2 | WEIGHT: 152 LBS | HEART RATE: 102 BPM | HEIGHT: 66 IN | SYSTOLIC BLOOD PRESSURE: 110 MMHG | RESPIRATION RATE: 16 BRPM | DIASTOLIC BLOOD PRESSURE: 70 MMHG | OXYGEN SATURATION: 94 %

## 2024-09-26 DIAGNOSIS — Z00.00 WELL ADULT EXAM: ICD-10-CM

## 2024-09-26 DIAGNOSIS — U07.1 COVID-19: ICD-10-CM

## 2024-09-26 PROCEDURE — 3078F DIAST BP <80 MM HG: CPT | Performed by: FAMILY MEDICINE

## 2024-09-26 PROCEDURE — 3074F SYST BP LT 130 MM HG: CPT | Performed by: FAMILY MEDICINE

## 2024-09-26 PROCEDURE — 99214 OFFICE O/P EST MOD 30 MIN: CPT | Performed by: FAMILY MEDICINE

## 2024-09-26 ASSESSMENT — FIBROSIS 4 INDEX: FIB4 SCORE: 1.09

## 2024-09-26 NOTE — PROGRESS NOTES
"Subjective:     CC: Covid 19    HPI:   Nicolas presents today with COVID-19.    He was seen at urgent care 5 days ago with positive COVID test.  Symptoms began about 3-4 days prior including headaches, fever, cough with thick green phlegm.  Temperature up to 101.6 Fahrenheit.  Has had continued fevers up until yesterday.  Did have temp of 100.4F yesterday morning but has also had some mild temperature elevations.  Does have continued cough and conjunctivitis.  Fatigue is improving, breathing stable and not worsening.  Overall feeling better this morning.    Had COVID before but no vaccinations.    Medications, past medical history, allergies, and social history have been reviewed and updated.      Objective:       Exam:  /70   Pulse (!) 102   Temp 36.8 °C (98.3 °F)   Resp 16   Ht 1.676 m (5' 6\")   Wt 68.9 kg (152 lb)   SpO2 94%   BMI 24.53 kg/m²  Body mass index is 24.53 kg/m².    Constitutional: Alert. Well appearing. No distress.  Skin: Warm, dry, good turgor, no visible rashes.  ENMT: Moist mucous membranes. Normal dentition.  Mild bilateral conjunctival injection.  Respiratory: Normal effort.  Mild bilateral expiratory wheezing.  Cardiovascular: Regular rate and rhythm. Normal S1/S2. No murmurs, rubs or gallops.   Neuro: Moves all four extremities. No facial droop.  Psych: Answers questions appropriately. Normal affect and mood.      Assessment & Plan:     49 y.o. male with the following -     1. COVID-19  Symptoms began about 9 days ago and has had lingering cough, fevers, conjunctivitis.  He is feeling improved this morning.  O2 sat at 94%, he is tachycardic.  No increased work of breathing but there is mild bilateral expiratory wheezing.  Considered CXR to evaluate for secondary bacterial infection but holding off given improvement today.  He will continue with supportive management at home.  Discussed reasons to seek care and ER precautions.    2. Well adult exam  Check yearly labs in 1 " month.  - CBC WITH DIFFERENTIAL; Future  - Comp Metabolic Panel; Future  - Lipid Profile; Future  - HEMOGLOBIN A1C; Future      Please note that this note was created using voice recognition software.

## 2024-09-26 NOTE — LETTER
September 26, 2024    To Whom It May Concern:         This is confirmation that Nicolas Green attended his scheduled appointment with Jose Carney M.D. on 9/26/24. He is currently ill with Covid 19. He is cleared to to return to work if improving on Tuesday, October 1st 2024.         If you have any questions please do not hesitate to call me at the phone number listed below.    Sincerely,          Jose Carney M.D.  [phone]

## 2024-10-01 ENCOUNTER — HOSPITAL ENCOUNTER (OUTPATIENT)
Dept: RADIOLOGY | Facility: MEDICAL CENTER | Age: 49
End: 2024-10-01
Attending: FAMILY MEDICINE
Payer: COMMERCIAL

## 2024-10-01 DIAGNOSIS — U07.1 COVID-19: ICD-10-CM

## 2024-10-01 DIAGNOSIS — J18.9 COMMUNITY ACQUIRED PNEUMONIA OF LEFT LOWER LOBE OF LUNG: ICD-10-CM

## 2024-10-01 PROCEDURE — 71046 X-RAY EXAM CHEST 2 VIEWS: CPT

## 2024-10-01 RX ORDER — DOXYCYCLINE 100 MG/1
100 CAPSULE ORAL 2 TIMES DAILY
Qty: 10 CAPSULE | Refills: 0 | Status: SHIPPED | OUTPATIENT
Start: 2024-10-01 | End: 2024-10-01 | Stop reason: SDUPTHER

## 2024-12-16 ENCOUNTER — HOSPITAL ENCOUNTER (OUTPATIENT)
Dept: LAB | Facility: MEDICAL CENTER | Age: 49
End: 2024-12-16
Attending: FAMILY MEDICINE
Payer: COMMERCIAL

## 2024-12-16 DIAGNOSIS — Z00.00 WELL ADULT EXAM: ICD-10-CM

## 2024-12-16 LAB
ALBUMIN SERPL BCP-MCNC: 4.5 G/DL (ref 3.2–4.9)
ALBUMIN/GLOB SERPL: 2.4 G/DL
ALP SERPL-CCNC: 94 U/L (ref 30–99)
ALT SERPL-CCNC: 49 U/L (ref 2–50)
ANION GAP SERPL CALC-SCNC: 10 MMOL/L (ref 7–16)
AST SERPL-CCNC: 35 U/L (ref 12–45)
BASOPHILS # BLD AUTO: 1.1 % (ref 0–1.8)
BASOPHILS # BLD: 0.07 K/UL (ref 0–0.12)
BILIRUB SERPL-MCNC: 0.6 MG/DL (ref 0.1–1.5)
BUN SERPL-MCNC: 24 MG/DL (ref 8–22)
CALCIUM ALBUM COR SERPL-MCNC: 8.8 MG/DL (ref 8.5–10.5)
CALCIUM SERPL-MCNC: 9.2 MG/DL (ref 8.5–10.5)
CHLORIDE SERPL-SCNC: 106 MMOL/L (ref 96–112)
CHOLEST SERPL-MCNC: 184 MG/DL (ref 100–199)
CO2 SERPL-SCNC: 26 MMOL/L (ref 20–33)
CREAT SERPL-MCNC: 1.18 MG/DL (ref 0.5–1.4)
EOSINOPHIL # BLD AUTO: 0.62 K/UL (ref 0–0.51)
EOSINOPHIL NFR BLD: 10.1 % (ref 0–6.9)
ERYTHROCYTE [DISTWIDTH] IN BLOOD BY AUTOMATED COUNT: 39 FL (ref 35.9–50)
EST. AVERAGE GLUCOSE BLD GHB EST-MCNC: 88 MG/DL
FASTING STATUS PATIENT QL REPORTED: NORMAL
GFR SERPLBLD CREATININE-BSD FMLA CKD-EPI: 76 ML/MIN/1.73 M 2
GLOBULIN SER CALC-MCNC: 1.9 G/DL (ref 1.9–3.5)
GLUCOSE SERPL-MCNC: 99 MG/DL (ref 65–99)
HBA1C MFR BLD: 4.7 % (ref 4–5.6)
HCT VFR BLD AUTO: 44.5 % (ref 42–52)
HDLC SERPL-MCNC: 42 MG/DL
HGB BLD-MCNC: 15.7 G/DL (ref 14–18)
IMM GRANULOCYTES # BLD AUTO: 0.02 K/UL (ref 0–0.11)
IMM GRANULOCYTES NFR BLD AUTO: 0.3 % (ref 0–0.9)
LDLC SERPL CALC-MCNC: 108 MG/DL
LYMPHOCYTES # BLD AUTO: 1.69 K/UL (ref 1–4.8)
LYMPHOCYTES NFR BLD: 27.7 % (ref 22–41)
MCH RBC QN AUTO: 31 PG (ref 27–33)
MCHC RBC AUTO-ENTMCNC: 35.3 G/DL (ref 32.3–36.5)
MCV RBC AUTO: 87.9 FL (ref 81.4–97.8)
MONOCYTES # BLD AUTO: 0.59 K/UL (ref 0–0.85)
MONOCYTES NFR BLD AUTO: 9.7 % (ref 0–13.4)
NEUTROPHILS # BLD AUTO: 3.12 K/UL (ref 1.82–7.42)
NEUTROPHILS NFR BLD: 51.1 % (ref 44–72)
NRBC # BLD AUTO: 0 K/UL
NRBC BLD-RTO: 0 /100 WBC (ref 0–0.2)
PLATELET # BLD AUTO: 184 K/UL (ref 164–446)
PMV BLD AUTO: 10.4 FL (ref 9–12.9)
POTASSIUM SERPL-SCNC: 4.6 MMOL/L (ref 3.6–5.5)
PROT SERPL-MCNC: 6.4 G/DL (ref 6–8.2)
RBC # BLD AUTO: 5.06 M/UL (ref 4.7–6.1)
SODIUM SERPL-SCNC: 142 MMOL/L (ref 135–145)
TRIGL SERPL-MCNC: 170 MG/DL (ref 0–149)
WBC # BLD AUTO: 6.1 K/UL (ref 4.8–10.8)

## 2024-12-16 PROCEDURE — 36415 COLL VENOUS BLD VENIPUNCTURE: CPT

## 2024-12-16 PROCEDURE — 85025 COMPLETE CBC W/AUTO DIFF WBC: CPT

## 2024-12-16 PROCEDURE — 80053 COMPREHEN METABOLIC PANEL: CPT

## 2024-12-16 PROCEDURE — 83036 HEMOGLOBIN GLYCOSYLATED A1C: CPT

## 2024-12-16 PROCEDURE — 80061 LIPID PANEL: CPT

## 2024-12-17 DIAGNOSIS — D72.10 EOSINOPHILIA, UNSPECIFIED TYPE: ICD-10-CM

## 2024-12-27 DIAGNOSIS — B00.1 RECURRENT COLD SORES: ICD-10-CM

## 2024-12-27 RX ORDER — ACYCLOVIR 400 MG/1
400 TABLET ORAL 3 TIMES DAILY
Qty: 30 TABLET | Refills: 0 | Status: SHIPPED | OUTPATIENT
Start: 2024-12-27

## 2024-12-27 NOTE — TELEPHONE ENCOUNTER
Received request via: Pharmacy    Was the patient seen in the last year in this department? Yes    Does the patient have an active prescription (recently filled or refills available) for medication(s) requested? No    Pharmacy Name: [facility]     Does the patient have skilled nursing Plus and need 100-day supply? (This applies to ALL medications) Patient does not have SCP

## 2025-01-17 ENCOUNTER — OFFICE VISIT (OUTPATIENT)
Dept: MEDICAL GROUP | Facility: LAB | Age: 50
End: 2025-01-17
Payer: COMMERCIAL

## 2025-01-17 ENCOUNTER — HOSPITAL ENCOUNTER (OUTPATIENT)
Dept: LAB | Facility: MEDICAL CENTER | Age: 50
End: 2025-01-17
Attending: FAMILY MEDICINE
Payer: COMMERCIAL

## 2025-01-17 VITALS
WEIGHT: 162.8 LBS | HEART RATE: 62 BPM | TEMPERATURE: 98 F | BODY MASS INDEX: 26.16 KG/M2 | DIASTOLIC BLOOD PRESSURE: 70 MMHG | HEIGHT: 66 IN | OXYGEN SATURATION: 98 % | SYSTOLIC BLOOD PRESSURE: 118 MMHG | RESPIRATION RATE: 16 BRPM

## 2025-01-17 DIAGNOSIS — M79.89 FINGER SWELLING: ICD-10-CM

## 2025-01-17 DIAGNOSIS — D72.10 EOSINOPHILIA, UNSPECIFIED TYPE: ICD-10-CM

## 2025-01-17 LAB
ALBUMIN SERPL BCP-MCNC: 4.8 G/DL (ref 3.2–4.9)
ALBUMIN/GLOB SERPL: 2.2 G/DL
ALP SERPL-CCNC: 100 U/L (ref 30–99)
ALT SERPL-CCNC: 58 U/L (ref 2–50)
ANION GAP SERPL CALC-SCNC: 14 MMOL/L (ref 7–16)
AST SERPL-CCNC: 47 U/L (ref 12–45)
BASOPHILS # BLD AUTO: 1.3 % (ref 0–1.8)
BASOPHILS # BLD: 0.07 K/UL (ref 0–0.12)
BILIRUB SERPL-MCNC: 0.7 MG/DL (ref 0.1–1.5)
BUN SERPL-MCNC: 27 MG/DL (ref 8–22)
CALCIUM ALBUM COR SERPL-MCNC: 9.5 MG/DL (ref 8.5–10.5)
CALCIUM SERPL-MCNC: 10.1 MG/DL (ref 8.5–10.5)
CHLORIDE SERPL-SCNC: 103 MMOL/L (ref 96–112)
CO2 SERPL-SCNC: 25 MMOL/L (ref 20–33)
CREAT SERPL-MCNC: 1.25 MG/DL (ref 0.5–1.4)
CRP SERPL HS-MCNC: 0.41 MG/DL (ref 0–0.75)
EOSINOPHIL # BLD AUTO: 0.45 K/UL (ref 0–0.51)
EOSINOPHIL NFR BLD: 8.2 % (ref 0–6.9)
ERYTHROCYTE [DISTWIDTH] IN BLOOD BY AUTOMATED COUNT: 38.6 FL (ref 35.9–50)
ERYTHROCYTE [SEDIMENTATION RATE] IN BLOOD BY WESTERGREN METHOD: 5 MM/HOUR (ref 0–20)
GFR SERPLBLD CREATININE-BSD FMLA CKD-EPI: 71 ML/MIN/1.73 M 2
GLOBULIN SER CALC-MCNC: 2.2 G/DL (ref 1.9–3.5)
GLUCOSE SERPL-MCNC: 82 MG/DL (ref 65–99)
HCT VFR BLD AUTO: 47.9 % (ref 42–52)
HGB BLD-MCNC: 16.3 G/DL (ref 14–18)
IMM GRANULOCYTES # BLD AUTO: 0.02 K/UL (ref 0–0.11)
IMM GRANULOCYTES NFR BLD AUTO: 0.4 % (ref 0–0.9)
LYMPHOCYTES # BLD AUTO: 1.55 K/UL (ref 1–4.8)
LYMPHOCYTES NFR BLD: 28.4 % (ref 22–41)
MCH RBC QN AUTO: 30.1 PG (ref 27–33)
MCHC RBC AUTO-ENTMCNC: 34 G/DL (ref 32.3–36.5)
MCV RBC AUTO: 88.5 FL (ref 81.4–97.8)
MONOCYTES # BLD AUTO: 0.53 K/UL (ref 0–0.85)
MONOCYTES NFR BLD AUTO: 9.7 % (ref 0–13.4)
NEUTROPHILS # BLD AUTO: 2.84 K/UL (ref 1.82–7.42)
NEUTROPHILS NFR BLD: 52 % (ref 44–72)
NRBC # BLD AUTO: 0 K/UL
NRBC BLD-RTO: 0 /100 WBC (ref 0–0.2)
PLATELET # BLD AUTO: 220 K/UL (ref 164–446)
PMV BLD AUTO: 10.4 FL (ref 9–12.9)
POTASSIUM SERPL-SCNC: 4 MMOL/L (ref 3.6–5.5)
PROT SERPL-MCNC: 7 G/DL (ref 6–8.2)
RBC # BLD AUTO: 5.41 M/UL (ref 4.7–6.1)
RHEUMATOID FACT SER IA-ACNC: <10 IU/ML (ref 0–14)
SODIUM SERPL-SCNC: 142 MMOL/L (ref 135–145)
WBC # BLD AUTO: 5.5 K/UL (ref 4.8–10.8)

## 2025-01-17 PROCEDURE — 80053 COMPREHEN METABOLIC PANEL: CPT

## 2025-01-17 PROCEDURE — 86812 HLA TYPING A B OR C: CPT

## 2025-01-17 PROCEDURE — 85025 COMPLETE CBC W/AUTO DIFF WBC: CPT

## 2025-01-17 PROCEDURE — 99214 OFFICE O/P EST MOD 30 MIN: CPT | Performed by: FAMILY MEDICINE

## 2025-01-17 PROCEDURE — 3074F SYST BP LT 130 MM HG: CPT | Performed by: FAMILY MEDICINE

## 2025-01-17 PROCEDURE — 36415 COLL VENOUS BLD VENIPUNCTURE: CPT

## 2025-01-17 PROCEDURE — 86431 RHEUMATOID FACTOR QUANT: CPT

## 2025-01-17 PROCEDURE — 86200 CCP ANTIBODY: CPT

## 2025-01-17 PROCEDURE — 3078F DIAST BP <80 MM HG: CPT | Performed by: FAMILY MEDICINE

## 2025-01-17 PROCEDURE — 86140 C-REACTIVE PROTEIN: CPT

## 2025-01-17 PROCEDURE — 85652 RBC SED RATE AUTOMATED: CPT

## 2025-01-17 RX ORDER — DOXYCYCLINE 100 MG/1
100 CAPSULE ORAL 2 TIMES DAILY
Qty: 10 CAPSULE | Refills: 0 | Status: SHIPPED | OUTPATIENT
Start: 2025-01-17 | End: 2025-01-22

## 2025-01-17 ASSESSMENT — FIBROSIS 4 INDEX: FIB4 SCORE: 1.28

## 2025-01-17 ASSESSMENT — PATIENT HEALTH QUESTIONNAIRE - PHQ9: CLINICAL INTERPRETATION OF PHQ2 SCORE: 0

## 2025-01-17 NOTE — PROGRESS NOTES
"Subjective:     CC: Finger swelling    HPI:   Nicolas presents today with concern for swelling to the right fifth finger over the last 4 days.  No inciting event or trauma.  There is also some mild erythema and tenderness.  No previous history of similar symptoms.  No other joints swollen.  No fevers.  Had a recent respiratory illness but otherwise feeling well.  No discrete puncture wound but does note that it would have been possible to have a small puncture that he would have not noticed.    Current Outpatient Medications   Medication Sig Dispense Refill    acyclovir (ZOVIRAX) 400 MG tablet TAKE ONE TABLET BY MOUTH THREE TIMES DAILY 30 Tablet 0    meloxicam (MOBIC) 15 MG tablet Take 1 Tablet by mouth every day. 30 Tablet 0    azithromycin (ZITHROMAX) 250 MG Tab Two day one then qd x 4. 6 Tablet 0    tobramycin (TOBREX) 0.3 % Solution ophthalmic solution Administer 1 Drop into the left eye every 4 hours. 5 mL 0    Zinc 20 MG Cap Take  by mouth.      Echinacea 125 MG Cap Take  by mouth.      Ascorbic Acid (VITAMIN C PO) Take  by mouth.      Multiple Vitamins-Minerals (CENTRUM ADULTS PO) Take  by mouth.      methylPREDNISolone (MEDROL DOSEPAK) 4 MG Tablet Therapy Pack As directed on the packaging label. (Patient not taking: Reported on 1/17/2025) 21 Tablet 0     No current facility-administered medications for this visit.       Medications, past medical history, allergies, and social history have been reviewed and updated.      Objective:       Exam:  /70 (BP Location: Left arm, Patient Position: Sitting, BP Cuff Size: Adult)   Pulse 62   Temp 36.7 °C (98 °F) (Temporal)   Resp 16   Ht 1.676 m (5' 6\")   Wt 73.8 kg (162 lb 12.8 oz)   SpO2 98%   BMI 26.28 kg/m²  Body mass index is 26.28 kg/m².    Constitutional: Alert. Well appearing. No distress.  Respiratory: Normal effort.   MSK: Diffuse swelling over the right fifth finger.  Minimal tenderness, mild erythema.  It is held in slight flexion but no " increased pain with passive extension.  No flexor sheath tenderness.  No discrete fluid collection or fluctuance.  Psych: Answers questions appropriately. Normal affect and mood.    Assessment & Plan:     49 y.o. male with the following -     1. Finger swelling    Presents with 4 days of nontraumatic diffuse right fifth finger swelling, there is also slightly flexed at rest but there is not pain with passive extension or tenderness along the flexor sheath concerning for recurrent infectious flexor tenosynovitis.  Possible paronychia with some surrounding cellulitis versus autoimmune/inflammatory cause.  Reports severe intolerance to Augmentin (would prefer augmentin with additional MRSA coverage).  Will start with doxycycline monotherapy but discussed importance of close follow-up if not improving and could broaden.  He is given ER precautions for developing additional signs of flexor tenosynovitis.  X-ray and autoimmune/inflammatory workup with labs as below.    - doxycycline (MONODOX) 100 MG capsule; Take 1 Capsule by mouth 2 times a day for 5 days.  Dispense: 10 Capsule; Refill: 0  - RHEUMATOID ARTHRITIS FACTOR; Future  - CCP ANTIBODIES, IGG/IGA; Future  - CRP QUANTITIVE (NON-CARDIAC); Future  - Sed Rate; Future  - HLA-B27; Future  - DX-FINGER(S) 2+ RIGHT; Future  - CBC WITH DIFFERENTIAL; Future  - Comp Metabolic Panel; Future      Please note that this note was created using voice recognition software.

## 2025-01-20 ENCOUNTER — APPOINTMENT (OUTPATIENT)
Dept: RADIOLOGY | Facility: MEDICAL CENTER | Age: 50
End: 2025-01-20
Attending: FAMILY MEDICINE
Payer: COMMERCIAL

## 2025-01-20 DIAGNOSIS — M79.89 FINGER SWELLING: ICD-10-CM

## 2025-01-20 LAB
CCP IGA+IGG SERPL IA-ACNC: 2 UNITS (ref 0–19)
HLA-B27 QL FC: NEGATIVE

## 2025-01-20 PROCEDURE — 73140 X-RAY EXAM OF FINGER(S): CPT | Mod: RT

## 2025-01-21 DIAGNOSIS — R79.89 LFTS ABNORMAL: ICD-10-CM

## 2025-01-22 ENCOUNTER — PATIENT MESSAGE (OUTPATIENT)
Dept: MEDICAL GROUP | Facility: LAB | Age: 50
End: 2025-01-22
Payer: COMMERCIAL

## 2025-01-22 DIAGNOSIS — M79.89 FINGER SWELLING: ICD-10-CM

## 2025-01-23 RX ORDER — DOXYCYCLINE HYCLATE 100 MG
100 TABLET ORAL 2 TIMES DAILY
Qty: 6 TABLET | Refills: 0 | Status: SHIPPED | OUTPATIENT
Start: 2025-01-23 | End: 2025-01-26

## 2025-01-28 ENCOUNTER — APPOINTMENT (OUTPATIENT)
Dept: MEDICAL GROUP | Facility: LAB | Age: 50
End: 2025-01-28
Payer: COMMERCIAL

## 2025-03-10 ENCOUNTER — HOSPITAL ENCOUNTER (OUTPATIENT)
Dept: LAB | Facility: MEDICAL CENTER | Age: 50
End: 2025-03-10
Attending: FAMILY MEDICINE
Payer: COMMERCIAL

## 2025-03-10 DIAGNOSIS — R79.89 LFTS ABNORMAL: ICD-10-CM

## 2025-03-10 LAB
ALBUMIN SERPL BCP-MCNC: 4.6 G/DL (ref 3.2–4.9)
ALP SERPL-CCNC: 98 U/L (ref 30–99)
ALT SERPL-CCNC: 43 U/L (ref 2–50)
AST SERPL-CCNC: 40 U/L (ref 12–45)
BILIRUB CONJ SERPL-MCNC: 0.2 MG/DL (ref 0.1–0.5)
BILIRUB INDIRECT SERPL-MCNC: 0.6 MG/DL (ref 0–1)
BILIRUB SERPL-MCNC: 0.8 MG/DL (ref 0.1–1.5)
PROT SERPL-MCNC: 6.6 G/DL (ref 6–8.2)

## 2025-03-10 PROCEDURE — 80076 HEPATIC FUNCTION PANEL: CPT

## 2025-03-10 PROCEDURE — 36415 COLL VENOUS BLD VENIPUNCTURE: CPT

## 2025-03-11 ENCOUNTER — RESULTS FOLLOW-UP (OUTPATIENT)
Dept: MEDICAL GROUP | Facility: LAB | Age: 50
End: 2025-03-11
Payer: COMMERCIAL

## 2025-06-16 ENCOUNTER — APPOINTMENT (OUTPATIENT)
Dept: URBAN - METROPOLITAN AREA CLINIC 22 | Facility: CLINIC | Age: 50
Setting detail: DERMATOLOGY
End: 2025-06-16

## 2025-06-16 DIAGNOSIS — L82.1 OTHER SEBORRHEIC KERATOSIS: ICD-10-CM

## 2025-06-16 DIAGNOSIS — Z71.89 OTHER SPECIFIED COUNSELING: ICD-10-CM

## 2025-06-16 DIAGNOSIS — D18.0 HEMANGIOMA: ICD-10-CM

## 2025-06-16 DIAGNOSIS — D22 MELANOCYTIC NEVI: ICD-10-CM

## 2025-06-16 DIAGNOSIS — L81.4 OTHER MELANIN HYPERPIGMENTATION: ICD-10-CM

## 2025-06-16 PROBLEM — D18.01 HEMANGIOMA OF SKIN AND SUBCUTANEOUS TISSUE: Status: ACTIVE | Noted: 2025-06-16

## 2025-06-16 PROBLEM — D23.39 OTHER BENIGN NEOPLASM OF SKIN OF OTHER PARTS OF FACE: Status: ACTIVE | Noted: 2025-06-16

## 2025-06-16 PROBLEM — D22.5 MELANOCYTIC NEVI OF TRUNK: Status: ACTIVE | Noted: 2025-06-16

## 2025-06-16 PROBLEM — D22.4 MELANOCYTIC NEVI OF SCALP AND NECK: Status: ACTIVE | Noted: 2025-06-16

## 2025-06-16 PROCEDURE — ? COUNSELING

## 2025-06-16 PROCEDURE — ? SUNSCREEN RECOMMENDATIONS

## 2025-06-16 ASSESSMENT — LOCATION ZONE DERM
LOCATION ZONE: SCALP
LOCATION ZONE: LEG
LOCATION ZONE: TRUNK

## 2025-06-16 ASSESSMENT — LOCATION SIMPLE DESCRIPTION DERM
LOCATION SIMPLE: POSTERIOR SCALP
LOCATION SIMPLE: RIGHT UPPER BACK
LOCATION SIMPLE: ABDOMEN
LOCATION SIMPLE: LEFT UPPER BACK
LOCATION SIMPLE: LEFT PRETIBIAL REGION

## 2025-06-16 ASSESSMENT — LOCATION DETAILED DESCRIPTION DERM
LOCATION DETAILED: EPIGASTRIC SKIN
LOCATION DETAILED: RIGHT INFERIOR UPPER BACK
LOCATION DETAILED: LEFT PROXIMAL PRETIBIAL REGION
LOCATION DETAILED: LEFT SUPERIOR MEDIAL UPPER BACK
LOCATION DETAILED: POSTERIOR MID-PARIETAL SCALP

## 2025-06-23 ENCOUNTER — OFFICE VISIT (OUTPATIENT)
Dept: MEDICAL GROUP | Facility: LAB | Age: 50
End: 2025-06-23
Payer: COMMERCIAL

## 2025-06-23 ENCOUNTER — HOSPITAL ENCOUNTER (OUTPATIENT)
Dept: LAB | Facility: MEDICAL CENTER | Age: 50
End: 2025-06-23
Attending: NURSE PRACTITIONER
Payer: COMMERCIAL

## 2025-06-23 VITALS
BODY MASS INDEX: 25.55 KG/M2 | HEIGHT: 66 IN | DIASTOLIC BLOOD PRESSURE: 68 MMHG | WEIGHT: 159 LBS | SYSTOLIC BLOOD PRESSURE: 118 MMHG | RESPIRATION RATE: 16 BRPM | TEMPERATURE: 98.2 F | OXYGEN SATURATION: 97 % | HEART RATE: 64 BPM

## 2025-06-23 DIAGNOSIS — Z77.29 EXPOSURE TO RODENT: ICD-10-CM

## 2025-06-23 DIAGNOSIS — M25.521 RIGHT ELBOW PAIN: ICD-10-CM

## 2025-06-23 DIAGNOSIS — J02.9 PHARYNGITIS, UNSPECIFIED ETIOLOGY: ICD-10-CM

## 2025-06-23 DIAGNOSIS — R19.5 CHANGE IN STOOL: ICD-10-CM

## 2025-06-23 PROCEDURE — 36415 COLL VENOUS BLD VENIPUNCTURE: CPT

## 2025-06-23 PROCEDURE — 3078F DIAST BP <80 MM HG: CPT | Performed by: NURSE PRACTITIONER

## 2025-06-23 PROCEDURE — 99213 OFFICE O/P EST LOW 20 MIN: CPT | Performed by: NURSE PRACTITIONER

## 2025-06-23 PROCEDURE — 3074F SYST BP LT 130 MM HG: CPT | Performed by: NURSE PRACTITIONER

## 2025-06-23 ASSESSMENT — FIBROSIS 4 INDEX: FIB4 SCORE: 1.3

## 2025-06-23 NOTE — PROGRESS NOTES
"Chief Complaint   Patient presents with    Elbow Pain     Right     GI Problem    Requesting Labs     hantavirus     Verbal consent was acquired by the patient to use [employer] ambient listening note generation during this visit Yes      HPI:  History of Present Illness  The patient presents for evaluation of elbow pain, sore throat, and gastrointestinal symptoms.    He has been experiencing intermittent elbow pain over the past 1 to 2 years, which is triggered by certain positions. The pain is described as a severe pinching sensation. He suspects a non-skeletal origin for this discomfort.    Approximately 1 to 1.5 weeks ago, he experienced a unilateral sore throat, particularly noticeable during swallowing, as if something was stuck in his throat. This symptom has resolved in the past 2 days. He recalls a previous incident where he had a foreign body sensation in his throat, which he eventually coughed up after 1 to 2 weeks. He also reports occasional enlargement of his right lymph node. He does not experience nasal congestion or cough with phlegm production but admits to blowing his nose twice daily.    Currently, he is experiencing mild gas and extremely soft stools for the past 2 days, but he does not feel ill and continues to work. He is employed as a  and recalls an incident 5 weeks ago where he was exposed to a foul odor from a rodent infestation in his truck's ventilation system. He is concerned about potential exposure to hantavirus and wishes to be tested.    SOCIAL HISTORY  He is employed as a .    ROS:  As documented in history of present illness above    Exam:   /68   Pulse 64   Temp 36.8 °C (98.2 °F)   Resp 16   Ht 1.676 m (5' 6\")   Wt 72.1 kg (159 lb)   SpO2 97%   BMI 25.66 kg/m²     Constitutional: Alert, no distress, plus 3 vital signs  Skin:  Warm, dry, no rashes invisible areas  Eye: Equal, round and reactive, conjunctiva clear  ENMT: Bilateral tympanic " membranes are retracted but translucent without erythema or perforation. No tonsillar enlargement.    Neck: Mild anterior cervical, nontender lymphadenopathy  Respiratory: Unlabored respiration  Psych: Alert, pleasant, well-groomed, normal affect    Assessment / Plan:  Assessment & Plan  Elbow pain.  Reports periodic elbow pain over the last year or two, occurring only in certain positions. An x-ray has been ordered. The order is valid for a year, and the patient can proceed with the x-ray if the pain worsens.    Sore throat.  The patient experienced a sore throat on one side, which has since resolved. This could be due to postnasal drainage or tonsil stones; fluid was noted behind the eardrums. The patient was advised to use Flonase to alleviate fluid accumulation behind the eardrums.     Gastrointestinal symptoms.  The patient reports gassiness and extremely soft stools for the past two days. These symptoms could be related to postnasal drainage or a viral infection. The patient continues to work and does not feel sick.    Potential hantavirus exposure.  The patient is concerned about potential hantavirus exposure due to their occupation as a  and a past incident involving mice in the truck's ventilation system. A blood test has been ordered to check for hantavirus antibodies. The patient was informed that treatment for hantavirus is mainly supportive, involving ICU monitoring.    Orders placed:  1. Right elbow pain  - DX-ELBOW-COMPLETE 3+ RIGHT; Future    2. Pharyngitis, unspecified etiology  - HANTAVIRUS ANTIBODIES INDU; Future    3. Change in stool    4. Exposure to rodent  - HANTAVIRUS ANTIBODIES INDU; Future      Please note that this dictation was created using voice recognition software. I have made every reasonable attempt to correct obvious errors, but I expect that there are errors of grammar and possibly content that I did not discover before finalizing the note.

## 2025-06-27 LAB
Lab: NORMAL
Lab: NORMAL

## 2025-07-01 ENCOUNTER — RESULTS FOLLOW-UP (OUTPATIENT)
Dept: MEDICAL GROUP | Facility: LAB | Age: 50
End: 2025-07-01
Payer: COMMERCIAL

## 2025-07-01 LAB
Lab: NORMAL
Lab: NORMAL
SIN NOMBRE VIRUS 93385: NORMAL
SIN NOMBRE VIRUS IGM 93386: NORMAL